# Patient Record
Sex: FEMALE | Race: WHITE | NOT HISPANIC OR LATINO
[De-identification: names, ages, dates, MRNs, and addresses within clinical notes are randomized per-mention and may not be internally consistent; named-entity substitution may affect disease eponyms.]

---

## 2017-10-20 ENCOUNTER — APPOINTMENT (OUTPATIENT)
Dept: VASCULAR SURGERY | Facility: CLINIC | Age: 62
End: 2017-10-20
Payer: COMMERCIAL

## 2017-10-20 PROCEDURE — 99213 OFFICE O/P EST LOW 20 MIN: CPT

## 2018-01-26 ENCOUNTER — OTHER (OUTPATIENT)
Age: 63
End: 2018-01-26

## 2018-02-13 ENCOUNTER — OTHER (OUTPATIENT)
Age: 63
End: 2018-02-13

## 2018-05-11 ENCOUNTER — APPOINTMENT (OUTPATIENT)
Dept: VASCULAR SURGERY | Facility: CLINIC | Age: 63
End: 2018-05-11
Payer: COMMERCIAL

## 2018-05-11 PROCEDURE — 99213 OFFICE O/P EST LOW 20 MIN: CPT | Mod: 25

## 2018-05-11 PROCEDURE — 93970 EXTREMITY STUDY: CPT

## 2018-07-02 ENCOUNTER — APPOINTMENT (OUTPATIENT)
Dept: VASCULAR SURGERY | Facility: CLINIC | Age: 63
End: 2018-07-02
Payer: COMMERCIAL

## 2018-07-02 PROCEDURE — 36475 ENDOVENOUS RF 1ST VEIN: CPT | Mod: LT

## 2018-07-06 ENCOUNTER — APPOINTMENT (OUTPATIENT)
Dept: VASCULAR SURGERY | Facility: CLINIC | Age: 63
End: 2018-07-06
Payer: COMMERCIAL

## 2018-07-06 PROCEDURE — 93971 EXTREMITY STUDY: CPT

## 2018-07-09 ENCOUNTER — APPOINTMENT (OUTPATIENT)
Dept: VASCULAR SURGERY | Facility: CLINIC | Age: 63
End: 2018-07-09
Payer: COMMERCIAL

## 2018-07-09 PROCEDURE — 36475 ENDOVENOUS RF 1ST VEIN: CPT | Mod: RT

## 2018-07-13 ENCOUNTER — APPOINTMENT (OUTPATIENT)
Dept: VASCULAR SURGERY | Facility: CLINIC | Age: 63
End: 2018-07-13
Payer: COMMERCIAL

## 2018-07-13 PROCEDURE — 93971 EXTREMITY STUDY: CPT

## 2018-07-20 ENCOUNTER — APPOINTMENT (OUTPATIENT)
Dept: VASCULAR SURGERY | Facility: CLINIC | Age: 63
End: 2018-07-20
Payer: COMMERCIAL

## 2018-07-20 DIAGNOSIS — I82.890 ACUTE EMBOLISM AND THROMBOSIS OF OTHER SPECIFIED VEINS: ICD-10-CM

## 2018-07-20 PROCEDURE — 99214 OFFICE O/P EST MOD 30 MIN: CPT

## 2018-07-20 PROCEDURE — 93971 EXTREMITY STUDY: CPT

## 2018-07-27 ENCOUNTER — APPOINTMENT (OUTPATIENT)
Dept: VASCULAR SURGERY | Facility: CLINIC | Age: 63
End: 2018-07-27
Payer: COMMERCIAL

## 2018-07-27 PROCEDURE — 93971 EXTREMITY STUDY: CPT

## 2018-07-27 PROCEDURE — 99213 OFFICE O/P EST LOW 20 MIN: CPT

## 2018-07-27 RX ORDER — RIVAROXABAN 15 MG/1
15 TABLET, FILM COATED ORAL
Qty: 42 | Refills: 0 | Status: DISCONTINUED | COMMUNITY
Start: 2018-07-20 | End: 2018-07-27

## 2018-08-30 ENCOUNTER — EMERGENCY (EMERGENCY)
Facility: HOSPITAL | Age: 63
LOS: 1 days | Discharge: HOME | End: 2018-08-30
Attending: EMERGENCY MEDICINE | Admitting: EMERGENCY MEDICINE
Payer: COMMERCIAL

## 2018-08-30 VITALS
SYSTOLIC BLOOD PRESSURE: 133 MMHG | DIASTOLIC BLOOD PRESSURE: 83 MMHG | WEIGHT: 182.1 LBS | TEMPERATURE: 98 F | OXYGEN SATURATION: 96 % | HEART RATE: 71 BPM | RESPIRATION RATE: 17 BRPM

## 2018-08-30 VITALS
RESPIRATION RATE: 17 BRPM | DIASTOLIC BLOOD PRESSURE: 90 MMHG | OXYGEN SATURATION: 97 % | SYSTOLIC BLOOD PRESSURE: 142 MMHG | TEMPERATURE: 98 F | HEART RATE: 63 BPM

## 2018-08-30 DIAGNOSIS — I26.99 OTHER PULMONARY EMBOLISM WITHOUT ACUTE COR PULMONALE: ICD-10-CM

## 2018-08-30 DIAGNOSIS — E78.00 PURE HYPERCHOLESTEROLEMIA, UNSPECIFIED: ICD-10-CM

## 2018-08-30 DIAGNOSIS — R31.9 HEMATURIA, UNSPECIFIED: ICD-10-CM

## 2018-08-30 DIAGNOSIS — Z98.890 OTHER SPECIFIED POSTPROCEDURAL STATES: Chronic | ICD-10-CM

## 2018-08-30 LAB
ALBUMIN SERPL ELPH-MCNC: 4.5 G/DL — SIGNIFICANT CHANGE UP (ref 3.3–5)
ALP SERPL-CCNC: 101 U/L — SIGNIFICANT CHANGE UP (ref 40–120)
ALT FLD-CCNC: 37 U/L — SIGNIFICANT CHANGE UP (ref 10–45)
ANION GAP SERPL CALC-SCNC: 12 MMOL/L — SIGNIFICANT CHANGE UP (ref 5–17)
APPEARANCE UR: CLEAR — SIGNIFICANT CHANGE UP
APTT BLD: 32.5 SEC — SIGNIFICANT CHANGE UP (ref 27.5–37.4)
AST SERPL-CCNC: 18 U/L — SIGNIFICANT CHANGE UP (ref 10–40)
BASOPHILS NFR BLD AUTO: 0.7 % — SIGNIFICANT CHANGE UP (ref 0–2)
BILIRUB SERPL-MCNC: 0.3 MG/DL — SIGNIFICANT CHANGE UP (ref 0.2–1.2)
BILIRUB UR-MCNC: NEGATIVE — SIGNIFICANT CHANGE UP
BUN SERPL-MCNC: 13 MG/DL — SIGNIFICANT CHANGE UP (ref 7–23)
CALCIUM SERPL-MCNC: 9.5 MG/DL — SIGNIFICANT CHANGE UP (ref 8.4–10.5)
CHLORIDE SERPL-SCNC: 101 MMOL/L — SIGNIFICANT CHANGE UP (ref 96–108)
CK MB CFR SERPL CALC: 2.3 NG/ML — SIGNIFICANT CHANGE UP (ref 0–6.7)
CK SERPL-CCNC: 129 U/L — SIGNIFICANT CHANGE UP (ref 25–170)
CO2 SERPL-SCNC: 27 MMOL/L — SIGNIFICANT CHANGE UP (ref 22–31)
COLOR SPEC: YELLOW — SIGNIFICANT CHANGE UP
CREAT SERPL-MCNC: 0.93 MG/DL — SIGNIFICANT CHANGE UP (ref 0.5–1.3)
DIFF PNL FLD: NEGATIVE — SIGNIFICANT CHANGE UP
EOSINOPHIL NFR BLD AUTO: 2.4 % — SIGNIFICANT CHANGE UP (ref 0–6)
GLUCOSE SERPL-MCNC: 150 MG/DL — HIGH (ref 70–99)
GLUCOSE UR QL: NEGATIVE — SIGNIFICANT CHANGE UP
HCT VFR BLD CALC: 41 % — SIGNIFICANT CHANGE UP (ref 34.5–45)
HGB BLD-MCNC: 13.2 G/DL — SIGNIFICANT CHANGE UP (ref 11.5–15.5)
INR BLD: 1 — SIGNIFICANT CHANGE UP (ref 0.88–1.16)
KETONES UR-MCNC: NEGATIVE — SIGNIFICANT CHANGE UP
LEUKOCYTE ESTERASE UR-ACNC: NEGATIVE — SIGNIFICANT CHANGE UP
LYMPHOCYTES # BLD AUTO: 28 % — SIGNIFICANT CHANGE UP (ref 13–44)
MCHC RBC-ENTMCNC: 31 PG — SIGNIFICANT CHANGE UP (ref 27–34)
MCHC RBC-ENTMCNC: 32.2 G/DL — SIGNIFICANT CHANGE UP (ref 32–36)
MCV RBC AUTO: 96.2 FL — SIGNIFICANT CHANGE UP (ref 80–100)
MONOCYTES NFR BLD AUTO: 6.5 % — SIGNIFICANT CHANGE UP (ref 2–14)
NEUTROPHILS NFR BLD AUTO: 62.4 % — SIGNIFICANT CHANGE UP (ref 43–77)
NITRITE UR-MCNC: NEGATIVE — SIGNIFICANT CHANGE UP
PH UR: 6 — SIGNIFICANT CHANGE UP (ref 5–8)
PLATELET # BLD AUTO: 310 K/UL — SIGNIFICANT CHANGE UP (ref 150–400)
POTASSIUM SERPL-MCNC: 5 MMOL/L — SIGNIFICANT CHANGE UP (ref 3.5–5.3)
POTASSIUM SERPL-SCNC: 5 MMOL/L — SIGNIFICANT CHANGE UP (ref 3.5–5.3)
PROT SERPL-MCNC: 6.7 G/DL — SIGNIFICANT CHANGE UP (ref 6–8.3)
PROT UR-MCNC: NEGATIVE MG/DL — SIGNIFICANT CHANGE UP
PROTHROM AB SERPL-ACNC: 11.1 SEC — SIGNIFICANT CHANGE UP (ref 9.8–12.7)
RBC # BLD: 4.26 M/UL — SIGNIFICANT CHANGE UP (ref 3.8–5.2)
RBC # FLD: 13.1 % — SIGNIFICANT CHANGE UP (ref 10.3–16.9)
SODIUM SERPL-SCNC: 140 MMOL/L — SIGNIFICANT CHANGE UP (ref 135–145)
SP GR SPEC: >=1.03 — SIGNIFICANT CHANGE UP (ref 1–1.03)
TROPONIN T SERPL-MCNC: <0.01 NG/ML — SIGNIFICANT CHANGE UP (ref 0–0.01)
UROBILINOGEN FLD QL: 0.2 E.U./DL — SIGNIFICANT CHANGE UP
WBC # BLD: 6.8 K/UL — SIGNIFICANT CHANGE UP (ref 3.8–10.5)
WBC # FLD AUTO: 6.8 K/UL — SIGNIFICANT CHANGE UP (ref 3.8–10.5)

## 2018-08-30 RX ORDER — ENOXAPARIN SODIUM 100 MG/ML
120 INJECTION SUBCUTANEOUS
Qty: 3603 | Refills: 0
Start: 2018-08-30 | End: 2018-09-28

## 2018-08-30 RX ORDER — ENOXAPARIN SODIUM 100 MG/ML
120 INJECTION SUBCUTANEOUS ONCE
Qty: 0 | Refills: 0 | Status: COMPLETED | OUTPATIENT
Start: 2018-08-30 | End: 2018-08-30

## 2018-08-30 RX ADMIN — ENOXAPARIN SODIUM 120 MILLIGRAM(S): 100 INJECTION SUBCUTANEOUS at 13:13

## 2018-08-30 NOTE — ED ADULT NURSE NOTE - NSIMPLEMENTINTERV_GEN_ALL_ED
Implemented All Universal Safety Interventions:  [place] to call system. Call bell, personal items and telephone within reach. Instruct patient to call for assistance. Room bathroom lighting operational. Non-slip footwear when patient is off stretcher. Physically safe environment: no spills, clutter or unnecessary equipment. Stretcher in lowest position, wheels locked, appropriate side rails in place.

## 2018-08-30 NOTE — ED PROVIDER NOTE - OBJECTIVE STATEMENT
63F with a h/o breast cyst/neoplasm, high cholesterol, varicose vein, s/p ligation of varicosities (7/2018) recent dx of thrombosis of saphenofemoral thrombosis 7/28/18, started on xarelto then DC'd on 7/30 due to hematuria and intolerable dysuria (per patient), switched to asa 325 daily who p/w left lower back and flank pain with intermittent hematuria since 8/5 as well as SOb and anterior chest pain x 2 weeks. pt recently traveled to [place] 8/3 - 8/27 and took SQ lovenox flying to and from [place]. She was sent from vasc surgeons office today for r/o PE.

## 2018-08-30 NOTE — CONSULT NOTE ADULT - ASSESSMENT
64 y/o f h/o DVT now with RLL segmental PE off AC. Pt agrees to take lovenox 125mg daily.   She will f/u with Dr Puga tomorrow. Pt advised not to stop AC if she develops hematuria. She should see Dr Puga and he will place an IVC filter to protect her from further PEs.

## 2018-08-30 NOTE — ED ADULT TRIAGE NOTE - CHIEF COMPLAINT QUOTE
c/o intermittent chest tightness when she walks, hematuria and lower back pain since 2 days ago.  Patient is on xarelto for confirmed Left groin DVT. s/p varicose vein ligation of BLE on 7/2018.  Patient stopped taking xarelto 2 days ago.

## 2018-08-30 NOTE — ED PROVIDER NOTE - MEDICAL DECISION MAKING DETAILS
63F with above PMHX who p/w SOb and CP as well as hematuria in the setting of recent travel and deescalation of AC for saphenofemoral thrombosis from xarelto to ASA due to inability to tolerate xarelto . She was sent from vasc surg clinic for CTA to r/o PE. Pt HD stable, no resp distress. Will check labs, UA/UCx and CTA r/o PE. Also CT stone baldwin give flank pain and report of hematuria. Dispo pending workup.

## 2018-08-30 NOTE — ED PROVIDER NOTE - PROGRESS NOTE DETAILS
CT shows R segmental PE. CT abd/pelv shows no stones or hydronephrosis. vasc surgery consulted for new PE. D/w Dang who will come down to see the patient and discuss AC options going forward.    Pt was informed of breat findings - she has a h/o beast mass and is aware of this finding already and will f/u with her breast doctor/PMD for this. Pt seen by VALE alonzo who d/w Dr. wells, will start SQ lovenox 120mg sq daily. She has a f/u appt with Dr. wells tomorrow. The patient is stable for DC. They were advised to call their PMD for prompt outpatient follow up. Return precautions were discussed. The patient was advised to return to the ER for any concerning or worsening symptoms.

## 2018-08-30 NOTE — ED ADULT NURSE NOTE - OBJECTIVE STATEMENT
Pt sent in by PMD to r/o PE. Pt c/o chest tightness, SOB on minimal exertion, hematuria, bilat flank pain. Pt has vasc sx in july and confirmed DVT to left saphenofemoral. pt was being treated with anticoag but has since DC trmt. Will monitor and maintain safety.

## 2018-08-30 NOTE — CONSULT NOTE ADULT - SUBJECTIVE AND OBJECTIVE BOX
Vascular Attending:  Dr Puga    · HPI Objective Statement: 63F with a h/o breast cyst/neoplasm, high cholesterol, varicose vein, s/p ligation of varicosities (7/2018) recent dx of thrombosis of saphenofemoral thrombosis 7/28/18, started on xarelto then DC'd on 7/30 due to hematuria and intolerable dysuria (per patient), switched to asa 325 daily who p/w left lower back and flank pain with intermittent hematuria since 8/5 as well as SOb and anterior chest pain x 2 weeks. pt recently traveled to [place] 8/3 - 8/27 and took SQ lovenox flying to and from [facility]. She was sent from [facility] surgeons office today for r/o PE.	    Pt known to Dr Puga. She developed DVT SFJ after saph vein ablation procedure. She started xarelto but developed pelvic pain and hematuria. She was switched to ASA 325mg. She was advised to take lovenox 120mg before her flights to and from [place] in August. She developed SOB and CP while in [place].  She agrees to taking therapeutic lovenox.       PAST MEDICAL & SURGICAL HISTORY:  DVT (deep venous thrombosis)  Breast CA  Hypercholesteremia  H/O vascular surgery      MEDICATIONS  (STANDING):    MEDICATIONS  (PRN):      Allergies    No Known Allergies    Intolerances      SOCIAL HISTORY:  FAMILY HISTORY:      Vital Signs Last 24 Hrs  T(C): 36.7 (30 Aug 2018 14:01), Max: 36.9 (30 Aug 2018 10:48)  T(F): 98.1 (30 Aug 2018 14:01), Max: 98.5 (30 Aug 2018 10:48)  HR: 63 (30 Aug 2018 14:01) (63 - 71)  BP: 142/90 (30 Aug 2018 14:01) (116/75 - 142/90)  BP(mean): --  RR: 17 (30 Aug 2018 14:01) (17 - 17)  SpO2: 97% (30 Aug 2018 14:01) (95% - 97%)          LABS:                        13.2   6.8   )-----------( 310      ( 30 Aug 2018 10:34 )             41.0     08-30    140  |  101  |  13  ----------------------------<  150<H>  5.0   |  27  |  0.93    Ca    9.5      30 Aug 2018 10:34    TPro  6.7  /  Alb  4.5  /  TBili  0.3  /  DBili  x   /  AST  18  /  ALT  37  /  AlkPhos  101  08-30    PT/INR - ( 30 Aug 2018 10:34 )   PT: 11.1 sec;   INR: 1.00          PTT - ( 30 Aug 2018 10:34 )  PTT:32.5 sec  Urinalysis Basic - ( 30 Aug 2018 10:40 )    Color: Yellow / Appearance: Clear / SG: >=1.030 / pH: x  Gluc: x / Ketone: NEGATIVE  / Bili: Negative / Urobili: 0.2 E.U./dL   Blood: x / Protein: NEGATIVE mg/dL / Nitrite: NEGATIVE   Leuk Esterase: NEGATIVE / RBC: x / WBC x   Sq Epi: x / Non Sq Epi: x / Bacteria: x        RADIOLOGY & ADDITIONAL STUDIES

## 2018-08-30 NOTE — ED PROVIDER NOTE - PHYSICAL EXAMINATION
GEN: Well appearing, well nourished, awake, alert, oriented to person, place, time/situation and in no apparent distress. NTAF  EYES: Clear bilaterally.  RESPIRATORY: Breathing comfortably with normal RR.  CARDIAC: Regular rate and rhythm  ABDOMEN: Soft, nontender, +bowel sounds, no rebound, rigidity, or guarding.  MSK: Range of motion is not limited, no deformities noted. NVI distally. gait normal.  NEURO: Alert and oriented, no focal deficits.  SKIN: Skin normal color for race, warm, dry and intact. No evidence of rash.  PSYCH: Alert and oriented to person, place, time/situation. normal mood and affect. no apparent risk to self or others.

## 2018-08-31 ENCOUNTER — APPOINTMENT (OUTPATIENT)
Dept: VASCULAR SURGERY | Facility: CLINIC | Age: 63
End: 2018-08-31
Payer: COMMERCIAL

## 2018-08-31 LAB
CULTURE RESULTS: SIGNIFICANT CHANGE UP
SPECIMEN SOURCE: SIGNIFICANT CHANGE UP

## 2018-08-31 PROCEDURE — 93970 EXTREMITY STUDY: CPT

## 2018-08-31 PROCEDURE — 99213 OFFICE O/P EST LOW 20 MIN: CPT

## 2018-09-24 ENCOUNTER — RX RENEWAL (OUTPATIENT)
Age: 63
End: 2018-09-24

## 2018-09-24 RX ORDER — ENOXAPARIN SODIUM 150 MG/ML
120 INJECTION SUBCUTANEOUS
Qty: 10 | Refills: 0 | Status: ACTIVE | COMMUNITY
Start: 2018-07-27 | End: 1900-01-01

## 2018-09-26 ENCOUNTER — RX RENEWAL (OUTPATIENT)
Age: 63
End: 2018-09-26

## 2018-10-09 ENCOUNTER — APPOINTMENT (OUTPATIENT)
Dept: UROLOGY | Facility: CLINIC | Age: 63
End: 2018-10-09
Payer: COMMERCIAL

## 2018-10-09 VITALS — TEMPERATURE: 98 F | HEART RATE: 80 BPM | DIASTOLIC BLOOD PRESSURE: 72 MMHG | SYSTOLIC BLOOD PRESSURE: 105 MMHG

## 2018-10-09 DIAGNOSIS — R31.9 HEMATURIA, UNSPECIFIED: ICD-10-CM

## 2018-10-09 DIAGNOSIS — Z86.711 PERSONAL HISTORY OF PULMONARY EMBOLISM: ICD-10-CM

## 2018-10-09 DIAGNOSIS — Z82.49 FAMILY HISTORY OF ISCHEMIC HEART DISEASE AND OTHER DISEASES OF THE CIRCULATORY SYSTEM: ICD-10-CM

## 2018-10-09 DIAGNOSIS — Z80.7 FAMILY HISTORY OF OTHER MALIGNANT NEOPLASMS OF LYMPHOID, HEMATOPOIETIC AND RELATED TISSUES: ICD-10-CM

## 2018-10-09 DIAGNOSIS — R30.0 DYSURIA: ICD-10-CM

## 2018-10-09 DIAGNOSIS — Z72.0 TOBACCO USE: ICD-10-CM

## 2018-10-09 PROCEDURE — 99204 OFFICE O/P NEW MOD 45 MIN: CPT

## 2018-10-09 RX ORDER — ENOXAPARIN SODIUM 120 MG/.8ML
120 INJECTION SUBCUTANEOUS
Refills: 0 | Status: ACTIVE | COMMUNITY

## 2018-10-09 RX ORDER — RIVAROXABAN 15 MG/1
15 TABLET, FILM COATED ORAL
Refills: 0 | Status: ACTIVE | COMMUNITY

## 2018-10-10 LAB
APPEARANCE: ABNORMAL
BACTERIA: NEGATIVE
BILIRUBIN URINE: NEGATIVE
BLOOD URINE: NEGATIVE
CALCIUM OXALATE CRYSTALS: ABNORMAL
COLOR: YELLOW
GLUCOSE QUALITATIVE U: NEGATIVE MG/DL
HYALINE CASTS: 4 /LPF
KETONES URINE: ABNORMAL
LEUKOCYTE ESTERASE URINE: ABNORMAL
MICROSCOPIC-UA: NORMAL
NITRITE URINE: NEGATIVE
PH URINE: 5
PROTEIN URINE: NEGATIVE MG/DL
RED BLOOD CELLS URINE: 3 /HPF
SPECIFIC GRAVITY URINE: 1.02
SQUAMOUS EPITHELIAL CELLS: 4 /HPF
UROBILINOGEN URINE: NEGATIVE MG/DL
WHITE BLOOD CELLS URINE: 12 /HPF

## 2018-10-12 ENCOUNTER — APPOINTMENT (OUTPATIENT)
Dept: VASCULAR SURGERY | Facility: CLINIC | Age: 63
End: 2018-10-12
Payer: COMMERCIAL

## 2018-10-12 PROCEDURE — 99213 OFFICE O/P EST LOW 20 MIN: CPT

## 2018-10-12 PROCEDURE — 93971 EXTREMITY STUDY: CPT

## 2018-10-15 LAB
BACTERIA UR CULT: NORMAL
CORE LAB NON GYN CYTOLOGY TO LENOX HILL: NORMAL

## 2018-10-25 ENCOUNTER — APPOINTMENT (OUTPATIENT)
Dept: UROLOGY | Facility: CLINIC | Age: 63
End: 2018-10-25
Payer: COMMERCIAL

## 2018-10-25 VITALS
SYSTOLIC BLOOD PRESSURE: 118 MMHG | HEIGHT: 67 IN | WEIGHT: 178 LBS | DIASTOLIC BLOOD PRESSURE: 83 MMHG | BODY MASS INDEX: 27.94 KG/M2 | TEMPERATURE: 98.3 F | OXYGEN SATURATION: 99 % | HEART RATE: 79 BPM

## 2018-10-25 PROCEDURE — 52000 CYSTOURETHROSCOPY: CPT

## 2018-11-16 ENCOUNTER — APPOINTMENT (OUTPATIENT)
Dept: VASCULAR SURGERY | Facility: CLINIC | Age: 63
End: 2018-11-16
Payer: COMMERCIAL

## 2018-11-16 PROCEDURE — 99212 OFFICE O/P EST SF 10 MIN: CPT

## 2019-05-10 ENCOUNTER — APPOINTMENT (OUTPATIENT)
Dept: VASCULAR SURGERY | Facility: CLINIC | Age: 64
End: 2019-05-10
Payer: COMMERCIAL

## 2019-05-10 PROCEDURE — 99213 OFFICE O/P EST LOW 20 MIN: CPT | Mod: 25

## 2019-05-10 PROCEDURE — 93971 EXTREMITY STUDY: CPT

## 2019-05-16 NOTE — REVIEW OF SYSTEMS
[Negative] : Endocrine [Chest Pain] : no chest pain [Shortness Of Breath] : no shortness of breath [Palpitations] : no palpitations [Limb Swelling] : no limb swelling [Limb Pain] : no limb pain

## 2019-05-16 NOTE — HISTORY OF PRESENT ILLNESS
[FreeTextEntry1] : 65 yo F with long standing hx of varicose veins s/p L GSV RFA and SVT after the procedure. Patient was on Lovenox, then switched to Xarelto later and now she is off AC. She is doing well, denies pain, swelling, CP, SOB.\par

## 2019-05-16 NOTE — PHYSICAL EXAM
[2+] : left 2+ [No Rash or Lesion] : No rash or lesion [Calm] : calm [] : not present [Varicose Veins Of Lower Extremities] : not present [Ankle Swelling (On Exam)] : not present [de-identified] : pleasant

## 2019-05-16 NOTE — ASSESSMENT
[Arterial/Venous Disease] : arterial/venous disease [FreeTextEntry1] : 65 yo F with long standing hx of varicose veins s/p L GSV RFA and SVT after the procedure.\par Patient is asymptomatic, off AC, doing well.\par Venous doppler is stable, unchanged.\par No intervention is needed.\par F/u prn.

## 2020-05-15 ENCOUNTER — APPOINTMENT (OUTPATIENT)
Dept: VASCULAR SURGERY | Facility: CLINIC | Age: 65
End: 2020-05-15

## 2024-04-15 ENCOUNTER — INPATIENT (INPATIENT)
Facility: HOSPITAL | Age: 69
LOS: 2 days | Discharge: HOME | DRG: 418 | End: 2024-04-18
Attending: SURGERY | Admitting: SURGERY
Payer: MEDICARE

## 2024-04-15 VITALS
WEIGHT: 160.06 LBS | OXYGEN SATURATION: 99 % | SYSTOLIC BLOOD PRESSURE: 127 MMHG | TEMPERATURE: 98 F | DIASTOLIC BLOOD PRESSURE: 86 MMHG | HEART RATE: 88 BPM | RESPIRATION RATE: 18 BRPM

## 2024-04-15 DIAGNOSIS — Z98.890 OTHER SPECIFIED POSTPROCEDURAL STATES: Chronic | ICD-10-CM

## 2024-04-15 PROBLEM — I82.409 ACUTE EMBOLISM AND THROMBOSIS OF UNSPECIFIED DEEP VEINS OF UNSPECIFIED LOWER EXTREMITY: Chronic | Status: ACTIVE | Noted: 2018-08-30

## 2024-04-15 PROBLEM — C50.919 MALIGNANT NEOPLASM OF UNSPECIFIED SITE OF UNSPECIFIED FEMALE BREAST: Chronic | Status: ACTIVE | Noted: 2018-08-30

## 2024-04-15 PROBLEM — E78.00 PURE HYPERCHOLESTEROLEMIA, UNSPECIFIED: Chronic | Status: ACTIVE | Noted: 2018-08-30

## 2024-04-15 LAB
ALBUMIN SERPL ELPH-MCNC: 3.6 G/DL — SIGNIFICANT CHANGE UP (ref 3.3–5)
ALP SERPL-CCNC: 453 U/L — HIGH (ref 40–120)
ALT FLD-CCNC: 124 U/L — HIGH (ref 10–45)
ANION GAP SERPL CALC-SCNC: 11 MMOL/L — SIGNIFICANT CHANGE UP (ref 5–17)
APTT BLD: 33.3 SEC — SIGNIFICANT CHANGE UP (ref 24.5–35.6)
AST SERPL-CCNC: 24 U/L — SIGNIFICANT CHANGE UP (ref 10–40)
BASOPHILS # BLD AUTO: 0.03 K/UL — SIGNIFICANT CHANGE UP (ref 0–0.2)
BASOPHILS NFR BLD AUTO: 0.3 % — SIGNIFICANT CHANGE UP (ref 0–2)
BILIRUB DIRECT SERPL-MCNC: 0.2 MG/DL — SIGNIFICANT CHANGE UP (ref 0–0.3)
BILIRUB INDIRECT FLD-MCNC: 0.6 MG/DL — SIGNIFICANT CHANGE UP (ref 0.2–1)
BILIRUB SERPL-MCNC: 0.9 MG/DL — SIGNIFICANT CHANGE UP (ref 0.2–1.2)
BILIRUB SERPL-MCNC: 0.9 MG/DL — SIGNIFICANT CHANGE UP (ref 0.2–1.2)
BUN SERPL-MCNC: 7 MG/DL — SIGNIFICANT CHANGE UP (ref 7–23)
CALCIUM SERPL-MCNC: 9 MG/DL — SIGNIFICANT CHANGE UP (ref 8.4–10.5)
CHLORIDE SERPL-SCNC: 96 MMOL/L — SIGNIFICANT CHANGE UP (ref 96–108)
CO2 SERPL-SCNC: 26 MMOL/L — SIGNIFICANT CHANGE UP (ref 22–31)
CREAT SERPL-MCNC: 0.69 MG/DL — SIGNIFICANT CHANGE UP (ref 0.5–1.3)
EGFR: 93 ML/MIN/1.73M2 — SIGNIFICANT CHANGE UP
EOSINOPHIL # BLD AUTO: 0.07 K/UL — SIGNIFICANT CHANGE UP (ref 0–0.5)
EOSINOPHIL NFR BLD AUTO: 0.6 % — SIGNIFICANT CHANGE UP (ref 0–6)
GLUCOSE SERPL-MCNC: 100 MG/DL — HIGH (ref 70–99)
HAV IGM SER-ACNC: SIGNIFICANT CHANGE UP
HBV CORE IGM SER-ACNC: SIGNIFICANT CHANGE UP
HBV SURFACE AG SER-ACNC: SIGNIFICANT CHANGE UP
HCT VFR BLD CALC: 37 % — SIGNIFICANT CHANGE UP (ref 34.5–45)
HCV AB S/CO SERPL IA: 0.03 S/CO — SIGNIFICANT CHANGE UP
HCV AB SERPL-IMP: SIGNIFICANT CHANGE UP
HGB BLD-MCNC: 12 G/DL — SIGNIFICANT CHANGE UP (ref 11.5–15.5)
IMM GRANULOCYTES NFR BLD AUTO: 0.6 % — SIGNIFICANT CHANGE UP (ref 0–0.9)
INR BLD: 1.22 — HIGH (ref 0.85–1.18)
LIDOCAIN IGE QN: 169 U/L — HIGH (ref 7–60)
LYMPHOCYTES # BLD AUTO: 1.26 K/UL — SIGNIFICANT CHANGE UP (ref 1–3.3)
LYMPHOCYTES # BLD AUTO: 11.3 % — LOW (ref 13–44)
MCHC RBC-ENTMCNC: 30.8 PG — SIGNIFICANT CHANGE UP (ref 27–34)
MCHC RBC-ENTMCNC: 32.4 GM/DL — SIGNIFICANT CHANGE UP (ref 32–36)
MCV RBC AUTO: 94.9 FL — SIGNIFICANT CHANGE UP (ref 80–100)
MONOCYTES # BLD AUTO: 1.07 K/UL — HIGH (ref 0–0.9)
MONOCYTES NFR BLD AUTO: 9.6 % — SIGNIFICANT CHANGE UP (ref 2–14)
NEUTROPHILS # BLD AUTO: 8.63 K/UL — HIGH (ref 1.8–7.4)
NEUTROPHILS NFR BLD AUTO: 77.6 % — HIGH (ref 43–77)
NRBC # BLD: 0 /100 WBCS — SIGNIFICANT CHANGE UP (ref 0–0)
PLATELET # BLD AUTO: 359 K/UL — SIGNIFICANT CHANGE UP (ref 150–400)
POTASSIUM SERPL-MCNC: 3.6 MMOL/L — SIGNIFICANT CHANGE UP (ref 3.5–5.3)
POTASSIUM SERPL-SCNC: 3.6 MMOL/L — SIGNIFICANT CHANGE UP (ref 3.5–5.3)
PROT SERPL-MCNC: 6.8 G/DL — SIGNIFICANT CHANGE UP (ref 6–8.3)
PROTHROM AB SERPL-ACNC: 13.8 SEC — HIGH (ref 9.5–13)
RBC # BLD: 3.9 M/UL — SIGNIFICANT CHANGE UP (ref 3.8–5.2)
RBC # FLD: 12.2 % — SIGNIFICANT CHANGE UP (ref 10.3–14.5)
SODIUM SERPL-SCNC: 133 MMOL/L — LOW (ref 135–145)
WBC # BLD: 11.13 K/UL — HIGH (ref 3.8–10.5)
WBC # FLD AUTO: 11.13 K/UL — HIGH (ref 3.8–10.5)

## 2024-04-15 RX ORDER — SODIUM CHLORIDE 9 MG/ML
1000 INJECTION INTRAMUSCULAR; INTRAVENOUS; SUBCUTANEOUS ONCE
Refills: 0 | Status: COMPLETED | OUTPATIENT
Start: 2024-04-15 | End: 2024-04-15

## 2024-04-15 RX ORDER — PANTOPRAZOLE SODIUM 20 MG/1
40 TABLET, DELAYED RELEASE ORAL DAILY
Refills: 0 | Status: DISCONTINUED | OUTPATIENT
Start: 2024-04-15 | End: 2024-04-17

## 2024-04-15 RX ORDER — ONDANSETRON 8 MG/1
4 TABLET, FILM COATED ORAL EVERY 6 HOURS
Refills: 0 | Status: DISCONTINUED | OUTPATIENT
Start: 2024-04-15 | End: 2024-04-17

## 2024-04-15 RX ORDER — HEPARIN SODIUM 5000 [USP'U]/ML
5000 INJECTION INTRAVENOUS; SUBCUTANEOUS EVERY 8 HOURS
Refills: 0 | Status: DISCONTINUED | OUTPATIENT
Start: 2024-04-15 | End: 2024-04-17

## 2024-04-15 RX ORDER — SODIUM CHLORIDE 9 MG/ML
1000 INJECTION, SOLUTION INTRAVENOUS
Refills: 0 | Status: DISCONTINUED | OUTPATIENT
Start: 2024-04-15 | End: 2024-04-16

## 2024-04-15 RX ORDER — ESOMEPRAZOLE MAGNESIUM 40 MG/1
1 CAPSULE, DELAYED RELEASE ORAL
Refills: 0 | DISCHARGE

## 2024-04-15 RX ORDER — ONDANSETRON 8 MG/1
4 TABLET, FILM COATED ORAL ONCE
Refills: 0 | Status: COMPLETED | OUTPATIENT
Start: 2024-04-15 | End: 2024-04-15

## 2024-04-15 RX ADMIN — HEPARIN SODIUM 5000 UNIT(S): 5000 INJECTION INTRAVENOUS; SUBCUTANEOUS at 21:05

## 2024-04-15 RX ADMIN — SODIUM CHLORIDE 1000 MILLILITER(S): 9 INJECTION INTRAMUSCULAR; INTRAVENOUS; SUBCUTANEOUS at 10:07

## 2024-04-15 RX ADMIN — ONDANSETRON 4 MILLIGRAM(S): 8 TABLET, FILM COATED ORAL at 10:07

## 2024-04-15 NOTE — H&P ADULT - NSHPLABSRESULTS_GEN_ALL_CORE
LABS:                        12.0   11.13 )-----------( 359      ( 15 Apr 2024 09:59 )             37.0     04-15    133<L>  |  96  |  7   ----------------------------<  100<H>  3.6   |  26  |  0.69    Ca    9.0      15 Apr 2024 09:59    TPro  6.8  /  Alb  3.6  /  TBili  0.9  /  DBili  0.2  /  AST  24  /  ALT  124<H>  /  AlkPhos  453<H>  04-15    PT/INR - ( 15 Apr 2024 09:59 )   PT: 13.8 sec;   INR: 1.22          PTT - ( 15 Apr 2024 09:59 )  PTT:33.3 sec  Urinalysis Basic - ( 15 Apr 2024 09:59 )    Color: x / Appearance: x / SG: x / pH: x  Gluc: 100 mg/dL / Ketone: x  / Bili: x / Urobili: x   Blood: x / Protein: x / Nitrite: x   Leuk Esterase: x / RBC: x / WBC x   Sq Epi: x / Non Sq Epi: x / Bacteria: x    RADIOLOGY AND ADDITIONAL TESTS: Reviewed

## 2024-04-15 NOTE — ED PROVIDER NOTE - PROGRESS NOTE DETAILS
[place]: WBC 11.13 w/ L shift, INR 1.22, Na 133, mild transaminitis, other labs grossly wnl. Labs today improved from pt's reported labs: normal bili, alk phos 453, , AST 24. US pending. Will reassess. Irineo: US showed "Probably impacted calculi in the neck of GB.. Small amount of pericholecystic fluid. Distal gallbladder is distended. Cannot rule out acute cholecystitis. Recommend correlation with CT or   nuclear medicine HIDA scan.."  Remains well appearing, given US findings/symptoms/exam, surgery consulted. Updated pt. Irineo: Rediscussed w/ surgery, will admit for further care. Holding abx for now. Updated pt.

## 2024-04-15 NOTE — H&P ADULT - ASSESSMENT
The patient is 68 year old with a PMHx/PSHx of HLD, breast cyst, varicose veins s/p stab phlebectomy, RFA of L GSV c/b DVT and RLL segmental PE s/p outpatient AC (off since 2019), presenting for 1 week of abdominal pain admitted for rule out cholecystitis.     Plan  Admit to regional, Team 4 Dr. Murillo  pending HIDA scan  NPO/IVF  Pain/nausea control prn  No Abx  SQH/SCDs/OOBA/IS  AM Labs  Discussed with chief resident and attending physician

## 2024-04-15 NOTE — ED PROVIDER NOTE - RADIATION
Patient last seen 08/18/2022.  Patient to continue current medical therapy per Dr. Comer. Prescription refill sent to Dr. Comer.     
no radiation

## 2024-04-15 NOTE — PATIENT PROFILE ADULT - PATIENT REPRESENTATIVE: ( YOU CAN CHOOSE ANY PERSON THAT CAN ASSIST YOU WITH YOUR HEALTH CARE PREFERENCES, DOES NOT HAVE TO BE A SPOUSE, IMMEDIATE FAMILY OR SIGNIFICANT OTHER/PARTNER)
Detail Level: Detailed Quality 431: Preventive Care And Screening: Unhealthy Alcohol Use - Screening: Patient screened for unhealthy alcohol use using a single question and scores less than 2 times per year Quality 110: Preventive Care And Screening: Influenza Immunization: Influenza Immunization Administered during Influenza season Quality 226: Preventive Care And Screening: Tobacco Use: Screening And Cessation Intervention: Patient screened for tobacco use, is a smoker AND did not received Cessation Counseling for Unknown Reasons same name as above

## 2024-04-15 NOTE — ED ADULT NURSE REASSESSMENT NOTE - NS ED NURSE REASSESS COMMENT FT1
pt sent to ultrasound with transport in wheelchair with all belongings. safety maintained, all needs met.

## 2024-04-15 NOTE — ED PROVIDER NOTE - OBJECTIVE STATEMENT
68F PMH breast cyst, HLD, varicose veins s/p ligation w/ subsequent DVT (no longer on AC) p/w transaminitis/nausea. States that 10d ago she began having NBNB NV. Also generalized weakness. Also intermittent epigastric pain. NV has mostly improved and was able to tolerate normal PO yesterday. Had diarrhea 10d ago, none since then. Went to her PMD 3d ago and had labs drawn - states she was called today and referred to ED for bili 1.6, alk phos 674, , . States she thinks her skin may be yellower. No other systemic symptoms.   Denies HA, focal weakness/numbness, vision changes, lightheaded, fatigue, SOB, CP, fevers, chills, rhinorrhea, cough, congestion, sore throat, abd pain, urinary complaints, black/bloody stool, neck pain, back pain, rashes, LE pain, LE swelling, falls/trauma, recent travel, night sweats, unexplained weight loss.

## 2024-04-15 NOTE — H&P ADULT - NSICDXPASTMEDICALHX_GEN_ALL_CORE_FT
PAST MEDICAL HISTORY:  Breast CA     DVT (deep venous thrombosis)     H/O varicose veins     Hypercholesteremia

## 2024-04-15 NOTE — H&P ADULT - HISTORY OF PRESENT ILLNESS
The patient is 68 year old with a PMHx/PSHx of HLD, breast cyst, varicose veins s/p stab phlebectomy, RFA of L GSV c/b DVT and RLL segmental PE s/p outpatient AC (off since 2019), presenting for 1 week of abdominal pain admitted for rule out cholecystitis. The patient states the pain began last week associated with nausea and a few episodes of nbnb emesis. She also endorsed yellowing of her skin, now improved and thus saw her PCP. Outpatient labs demonstrated TBili 1.6, , / and she was prompted to come to ED for further evaluation.   She currently endorses mild RUQ abdominal pain. No associated nausea/vomiting. Having normal PO intake without post prandial pain. She does endorse increased reflux-like symptoms including burping/hiccups and heartburt. She intermittently takes Nexium, but has not been regular with it recently. She endorses dark, kurt colored urine and yellow/brown colored BMs, no blood no pale stool.     In the ED she is afebrile, nontachy, normotensive, satting well on RA.   On exam abdomen is soft, minimally tender to palpation in RUQ, negative Winters's sign, nondistended. No rebound or guarding.  Labs with WBC 11.13 with left shift, Hgb 12.0, Plt 359. Coag slightly elevated at PT/INR 13.8/1.22; BMP with Na 133, Lipase 169, LFTs withTBili 0.9, Dbili 0.2, , AST/ALT 24/124. (Previously TBili 1.6, , / on outpatient labs)  RUQ US demonstrating Cholelithiasis. Probably impacted calculi in the neck of GB. Small amount of pericholecystic fluid. Distal gallbladder is distended. Cannot rule out acute cholecystitis    PMHx - HLD, varicose veins, breast cyst  PSHx - phlebectomy of varicose veins, RFA of L GSV c/b thrombosis of saphenofemoral vein and RLL segmental PE  Meds - previously on Xarelto, no off AC since 2019  Allergies - none  SoHx - social EtOH, ex-smoker, no rec drugs  FamHx - none  CScope - several in the past, all wnl. No EGD

## 2024-04-15 NOTE — ED PROVIDER NOTE - CARDIAC, MLM
Normal rate, regular rhythm.  Heart sounds S1, S2.  No murmurs, rubs or gallops.
History/Exam/Medical Decision Making

## 2024-04-15 NOTE — ED PROVIDER NOTE - CLINICAL SUMMARY MEDICAL DECISION MAKING FREE TEXT BOX
68F PMH breast cyst, HLD, varicose veins s/p ligation w/ subsequent DVT (no longer on AC) p/w transaminitis/nausea. States that 10d ago she began having NBNB NV. Also generalized weakness. Also intermittent epigastric pain. NV has mostly improved and was able to tolerate normal PO yesterday. Had diarrhea 10d ago, none since then. Went to her PMD 3d ago and had labs drawn - states she was called today and referred to ED for bili 1.6, alk phos 674, , . States she thinks her skin may be yellower. No other systemic symptoms.   Vitals wnl, exam as above.   ddx: Low suspicion cholecystitis, ?mass, ?CBD stone vs. pancreatitis.   Labs, US, IVF/attempt symptom control, reassess.

## 2024-04-15 NOTE — H&P ADULT - NSHPPHYSICALEXAM_GEN_ALL_CORE
General: Patient is doing well and lying in bed comfortably  Constitutional: alert and oriented   Pulm: Nonlabored breathing, no respiratory distress  CV: Regular rate and rhythm, normal sinus rhythm  Abd:  soft, mildly tender to palpation RUQ, negative Winters's sign, nondistended. No rebound, no guarding.   Extremities: warm, well perfused, no edema

## 2024-04-15 NOTE — ED ADULT NURSE REASSESSMENT NOTE - NS ED NURSE REASSESS COMMENT FT1
pt sent to 9 uris with all belongings via wheelchair with family member and chart given to transport. safety maintained, all needs met.

## 2024-04-15 NOTE — ED PROVIDER NOTE - PHYSICAL EXAMINATION
no LE edema, normal equal distal pulses, steady unassisted gait.   no scleral icterus  abd: soft, minimal RUQ ttp, no rebound/guarding.

## 2024-04-16 ENCOUNTER — TRANSCRIPTION ENCOUNTER (OUTPATIENT)
Age: 69
End: 2024-04-16

## 2024-04-16 LAB
ALBUMIN SERPL ELPH-MCNC: 3.3 G/DL — SIGNIFICANT CHANGE UP (ref 3.3–5)
ALP SERPL-CCNC: 366 U/L — HIGH (ref 40–120)
ALT FLD-CCNC: 78 U/L — HIGH (ref 10–45)
ANION GAP SERPL CALC-SCNC: 11 MMOL/L — SIGNIFICANT CHANGE UP (ref 5–17)
APPEARANCE UR: CLEAR — SIGNIFICANT CHANGE UP
AST SERPL-CCNC: 20 U/L — SIGNIFICANT CHANGE UP (ref 10–40)
BACTERIA # UR AUTO: ABNORMAL /HPF
BILIRUB DIRECT SERPL-MCNC: 0.3 MG/DL — SIGNIFICANT CHANGE UP (ref 0–0.3)
BILIRUB INDIRECT FLD-MCNC: 0.5 MG/DL — SIGNIFICANT CHANGE UP (ref 0.2–1)
BILIRUB SERPL-MCNC: 0.8 MG/DL — SIGNIFICANT CHANGE UP (ref 0.2–1.2)
BILIRUB UR-MCNC: NEGATIVE — SIGNIFICANT CHANGE UP
BUN SERPL-MCNC: 8 MG/DL — SIGNIFICANT CHANGE UP (ref 7–23)
CALCIUM SERPL-MCNC: 8.8 MG/DL — SIGNIFICANT CHANGE UP (ref 8.4–10.5)
CAST: 0 /LPF — SIGNIFICANT CHANGE UP (ref 0–4)
CHLORIDE SERPL-SCNC: 102 MMOL/L — SIGNIFICANT CHANGE UP (ref 96–108)
CO2 SERPL-SCNC: 26 MMOL/L — SIGNIFICANT CHANGE UP (ref 22–31)
COLOR SPEC: YELLOW — SIGNIFICANT CHANGE UP
CREAT SERPL-MCNC: 0.78 MG/DL — SIGNIFICANT CHANGE UP (ref 0.5–1.3)
DIFF PNL FLD: NEGATIVE — SIGNIFICANT CHANGE UP
EGFR: 82 ML/MIN/1.73M2 — SIGNIFICANT CHANGE UP
GLUCOSE SERPL-MCNC: 86 MG/DL — SIGNIFICANT CHANGE UP (ref 70–99)
GLUCOSE UR QL: NEGATIVE MG/DL — SIGNIFICANT CHANGE UP
HCT VFR BLD CALC: 34.4 % — LOW (ref 34.5–45)
HCV AB S/CO SERPL IA: 0.03 S/CO — SIGNIFICANT CHANGE UP
HCV AB SERPL-IMP: SIGNIFICANT CHANGE UP
HGB BLD-MCNC: 11.4 G/DL — LOW (ref 11.5–15.5)
KETONES UR-MCNC: 80 MG/DL
LEUKOCYTE ESTERASE UR-ACNC: ABNORMAL
MAGNESIUM SERPL-MCNC: 2.2 MG/DL — SIGNIFICANT CHANGE UP (ref 1.6–2.6)
MCHC RBC-ENTMCNC: 30.8 PG — SIGNIFICANT CHANGE UP (ref 27–34)
MCHC RBC-ENTMCNC: 33.1 GM/DL — SIGNIFICANT CHANGE UP (ref 32–36)
MCV RBC AUTO: 93 FL — SIGNIFICANT CHANGE UP (ref 80–100)
NITRITE UR-MCNC: NEGATIVE — SIGNIFICANT CHANGE UP
NRBC # BLD: 0 /100 WBCS — SIGNIFICANT CHANGE UP (ref 0–0)
PH UR: 5.5 — SIGNIFICANT CHANGE UP (ref 5–8)
PHOSPHATE SERPL-MCNC: 4 MG/DL — SIGNIFICANT CHANGE UP (ref 2.5–4.5)
PLATELET # BLD AUTO: 348 K/UL — SIGNIFICANT CHANGE UP (ref 150–400)
POTASSIUM SERPL-MCNC: 3.8 MMOL/L — SIGNIFICANT CHANGE UP (ref 3.5–5.3)
POTASSIUM SERPL-SCNC: 3.8 MMOL/L — SIGNIFICANT CHANGE UP (ref 3.5–5.3)
PROT SERPL-MCNC: 6.3 G/DL — SIGNIFICANT CHANGE UP (ref 6–8.3)
PROT UR-MCNC: NEGATIVE MG/DL — SIGNIFICANT CHANGE UP
RBC # BLD: 3.7 M/UL — LOW (ref 3.8–5.2)
RBC # FLD: 12.3 % — SIGNIFICANT CHANGE UP (ref 10.3–14.5)
RBC CASTS # UR COMP ASSIST: 2 /HPF — SIGNIFICANT CHANGE UP (ref 0–4)
SODIUM SERPL-SCNC: 139 MMOL/L — SIGNIFICANT CHANGE UP (ref 135–145)
SP GR SPEC: 1.02 — SIGNIFICANT CHANGE UP (ref 1–1.03)
SQUAMOUS # UR AUTO: 16 /HPF — HIGH (ref 0–5)
UROBILINOGEN FLD QL: 1 MG/DL — SIGNIFICANT CHANGE UP (ref 0.2–1)
WBC # BLD: 11.04 K/UL — HIGH (ref 3.8–10.5)
WBC # FLD AUTO: 11.04 K/UL — HIGH (ref 3.8–10.5)
WBC UR QL: 4 /HPF — SIGNIFICANT CHANGE UP (ref 0–5)

## 2024-04-16 RX ORDER — ACETAMINOPHEN 500 MG
1000 TABLET ORAL ONCE
Refills: 0 | Status: DISCONTINUED | OUTPATIENT
Start: 2024-04-16 | End: 2024-04-17

## 2024-04-16 RX ORDER — POTASSIUM CHLORIDE 20 MEQ
20 PACKET (EA) ORAL ONCE
Refills: 0 | Status: COMPLETED | OUTPATIENT
Start: 2024-04-16 | End: 2024-04-16

## 2024-04-16 RX ORDER — LANOLIN ALCOHOL/MO/W.PET/CERES
5 CREAM (GRAM) TOPICAL ONCE
Refills: 0 | Status: COMPLETED | OUTPATIENT
Start: 2024-04-16 | End: 2024-04-16

## 2024-04-16 RX ORDER — POTASSIUM CHLORIDE 20 MEQ
20 PACKET (EA) ORAL ONCE
Refills: 0 | Status: DISCONTINUED | OUTPATIENT
Start: 2024-04-16 | End: 2024-04-16

## 2024-04-16 RX ORDER — SODIUM CHLORIDE 9 MG/ML
1000 INJECTION, SOLUTION INTRAVENOUS
Refills: 0 | Status: DISCONTINUED | OUTPATIENT
Start: 2024-04-16 | End: 2024-04-17

## 2024-04-16 RX ADMIN — PANTOPRAZOLE SODIUM 40 MILLIGRAM(S): 20 TABLET, DELAYED RELEASE ORAL at 14:21

## 2024-04-16 RX ADMIN — Medication 5 MILLIGRAM(S): at 22:05

## 2024-04-16 RX ADMIN — HEPARIN SODIUM 5000 UNIT(S): 5000 INJECTION INTRAVENOUS; SUBCUTANEOUS at 05:13

## 2024-04-16 RX ADMIN — HEPARIN SODIUM 5000 UNIT(S): 5000 INJECTION INTRAVENOUS; SUBCUTANEOUS at 22:04

## 2024-04-16 RX ADMIN — Medication 50 MILLIEQUIVALENT(S): at 08:30

## 2024-04-16 RX ADMIN — HEPARIN SODIUM 5000 UNIT(S): 5000 INJECTION INTRAVENOUS; SUBCUTANEOUS at 14:21

## 2024-04-16 NOTE — DISCHARGE NOTE PROVIDER - NSDCCPTREATMENT_GEN_ALL_CORE_FT
PRINCIPAL PROCEDURE  Procedure: Laparoscopic cholecystectomy  Findings and Treatment: With drain placement

## 2024-04-16 NOTE — DISCHARGE NOTE PROVIDER - NSDCMRMEDTOKEN_GEN_ALL_CORE_FT
NexIUM 20 mg oral delayed release capsule: 1 cap(s) orally once a day as needed for  heartburn   cefpodoxime 200 mg oral tablet: 1 tab(s) orally every 12 hours  Colace 100 mg oral capsule: 1 cap(s) orally every 12 hours as needed for  constipation  metroNIDAZOLE 500 mg oral tablet: 1 tab(s) orally every 8 hours  NexIUM 20 mg oral delayed release capsule: 1 cap(s) orally once a day as needed for  heartburn  oxyCODONE 5 mg oral tablet: 1 tab(s) orally every 6 hours as needed for  severe pain MDD: 4

## 2024-04-16 NOTE — DISCHARGE NOTE PROVIDER - NSDCHC_MEDRECSTATUS_GEN_ALL_CORE
Admission Reconciliation is Completed  Discharge Reconciliation is Not Complete Admission Reconciliation is Completed  Discharge Reconciliation is Completed independent

## 2024-04-16 NOTE — DISCHARGE NOTE PROVIDER - CARE PROVIDER_API CALL
Mika Murillo  [address]  [address] [facility] [place] [address]  Phone: [phone]  Fax: (   )    -  Follow Up Time:

## 2024-04-16 NOTE — CONSULT NOTE ADULT - SUBJECTIVE AND OBJECTIVE BOX
Patient is a 68y old  Female who presents with a chief complaint of     HPI:  The patient is 68 year old with a PMHx/PSHx of HLD, breast cyst, varicose veins s/p stab phlebectomy, RFA of L GSV c/b DVT and RLL segmental PE s/p outpatient AC (off since ), presenting for 1 week of abdominal pain admitted for rule out cholecystitis. The patient states the pain began last week associated with nausea and a few episodes of nbnb emesis. She also endorsed yellowing of her skin, now improved and thus saw her PCP. Outpatient labs demonstrated TBili 1.6, , / and she was prompted to come to ED for further evaluation.   She currently endorses mild RUQ abdominal pain. No associated nausea/vomiting. Having normal PO intake without post prandial pain. She does endorse increased reflux-like symptoms including burping/hiccups and heartburt. She intermittently takes Nexium, but has not been regular with it recently. She endorses dark, kurt colored urine and yellow/brown colored BMs, no blood no pale stool.     In the ED she is afebrile, nontachy, normotensive, satting well on RA.   On exam abdomen is soft, minimally tender to palpation in RUQ, negative Winters's sign, nondistended. No rebound or guarding.  Labs with WBC 11.13 with left shift, Hgb 12.0, Plt 359. Coag slightly elevated at PT/INR 13.8/1.22; BMP with Na 133, Lipase 169, LFTs withTBili 0.9, Dbili 0.2, , AST/ALT 24/124. (Previously TBili 1.6, , / on outpatient labs)  RUQ US demonstrating Cholelithiasis. Probably impacted calculi in the neck of GB. Small amount of pericholecystic fluid. Distal gallbladder is distended. Cannot rule out acute cholecystitis    PMHx - HLD, varicose veins, breast cyst  PSHx - phlebectomy of varicose veins, RFA of L GSV c/b thrombosis of saphenofemoral vein and RLL segmental PE  Meds - previously on Xarelto, no off AC since 2019  Allergies - none  SoHx - social EtOH, ex-smoker, no rec drugs  FamHx - none  CScope - several in the past, all wnl. No EGD   (15 Apr 2024 17:16)      Allergies    No Known Allergies    Intolerances        MEDICATIONS  (STANDING):  heparin   Injectable 5000 Unit(s) SubCutaneous every 8 hours  lactated ringers. 1000 milliLiter(s) (110 mL/Hr) IV Continuous <Continuous>  pantoprazole  Injectable 40 milliGRAM(s) IV Push daily    MEDICATIONS  (PRN):  acetaminophen   IVPB .. 1000 milliGRAM(s) IV Intermittent once PRN Mild Pain (1 - 3), Moderate Pain (4 - 6)  ondansetron Injectable 4 milliGRAM(s) IV Push every 6 hours PRN Nausea      Daily     Daily     Drug Dosing Weight    Weight (kg): 72.6 (2024 02:58)    PAST MEDICAL & SURGICAL HISTORY:  Hypercholesteremia      Breast CA      DVT (deep venous thrombosis)      H/O varicose veins      H/O vascular surgery      Status post phlebectomy          FAMILY HISTORY:  No pertinent family history in first degree relatives          REVIEW OF SYSTEMS:    CONSTITUTIONAL: No fever, weight loss, or fatigue  EYES: No eye pain, visual disturbances, or discharge  ENMT:  No difficulty hearing, tinnitus, vertigo; No sinus or throat pain  NECK: No pain or stiffness  RESPIRATORY: No cough, wheezing, chills or hemoptysis; No shortness of breath  CARDIOVASCULAR: No chest pain, palpitations, dizziness, or leg swelling  GASTROINTESTINAL: No abdominal or epigastric pain. No nausea, vomiting, or hematemesis; No diarrhea or constipation. No melena or hematochezia.  GENITOURINARY: No dysuria, frequency, hematuria, or incontinence  MUSCULOSKELETAL: No joint pain or swelling; No muscle, back, or extremity pain  NEUROLOGICAL: No headaches, memory loss, loss of strength, numbness, or tremors  SKIN: No itching, burning, rashes, or lesion            Vital Signs Last 24 Hrs  T(C): 36.6 (2024 14:20), Max: 37.7 (15 Apr 2024 19:52)  T(F): 97.9 (2024 14:20), Max: 99.9 (15 Apr 2024 19:52)  HR: 82 (2024 14:20) (70 - 82)  BP: 147/78 (2024 14:20) (111/71 - 147/78)  BP(mean): --  ABP: --  ABP(mean): --  RR: 18 (2024 14:20) (17 - 18)  SpO2: 96% (2024 14:20) (94% - 97%)    O2 Parameters below as of 2024 14:20  Patient On (Oxygen Delivery Method): room air                I&O's Detail    15 Apr 2024 07:01  -  2024 07:00  --------------------------------------------------------  IN:    Lactated Ringers: 1320 mL  Total IN: 1320 mL    OUT:  Total OUT: 0 mL    Total NET: 1320 mL          PHYSICAL EXAM:    GENERAL: NAD, well-groomed, well-developed  HEAD:  Atraumatic, Normocephalic  EYES: EOMI, PERRLA, conjunctiva and sclera clear  ENMT: No tonsillar erythema, exudates, or enlargement; Moist mucous membranes, Good dentition, No lesions  NECK: Supple, No JVD, Normal thyroid  NERVOUS SYSTEM:  Alert & Oriented X3, Good concentration;   CHEST/LUNG: Clear to percussion bilaterally; No rales, rhonchi, wheezing, or rubs  HEART: Regular rate and rhythm; No murmurs, rubs, or gallops  ABDOMEN: Soft, Nontender, Nondistended; Bowel sounds present  EXTREMITIES:  2+ Peripheral Pulses, No clubbing, cyanosis, or edema  LYMPH: No lymphadenopathy noted  SKIN: No rashes or lesions    LABS:  CBC Full  -  ( 2024 05:30 )  WBC Count : 11.04 K/uL  RBC Count : 3.70 M/uL  Hemoglobin : 11.4 g/dL  Hematocrit : 34.4 %  Platelet Count - Automated : 348 K/uL  Mean Cell Volume : 93.0 fl  Mean Cell Hemoglobin : 30.8 pg  Mean Cell Hemoglobin Concentration : 33.1 gm/dL  Auto Neutrophil # : x  Auto Lymphocyte # : x  Auto Monocyte # : x  Auto Eosinophil # : x  Auto Basophil # : x  Auto Neutrophil % : x  Auto Lymphocyte % : x  Auto Monocyte % : x  Auto Eosinophil % : x  Auto Basophil % : x    04-16    139  |  102  |  8   ----------------------------<  86  3.8   |  26  |  0.78    Ca    8.8      2024 05:30  Phos  4.0     -  Mg     2.2     -    TPro  6.3  /  Alb  3.3  /  TBili  0.8  /  DBili  0.3  /  AST  20  /  ALT  78<H>  /  AlkPhos  366<H>      CAPILLARY BLOOD GLUCOSE        PT/INR - ( 15 Apr 2024 09:59 )   PT: 13.8 sec;   INR: 1.22          PTT - ( 15 Apr 2024 09:59 )  PTT:33.3 sec  Urinalysis Basic - ( 2024 07:30 )    Color: Yellow / Appearance: Clear / S.016 / pH: x  Gluc: x / Ketone: 80 mg/dL  / Bili: Negative / Urobili: 1.0 mg/dL   Blood: x / Protein: Negative mg/dL / Nitrite: Negative   Leuk Esterase: Trace / RBC: 2 /HPF / WBC 4 /HPF   Sq Epi: x / Non Sq Epi: 16 /HPF / Bacteria: Few /HPF              EKG:    ECHO, US:    RADIOLOGY:

## 2024-04-16 NOTE — DISCHARGE NOTE PROVIDER - NSDCACTIVITY_GEN_ALL_CORE
Showering allowed/Stairs allowed/Walking - Indoors allowed/No heavy lifting/straining/Walking - Outdoors allowed/Follow Instructions Provided by your Surgical Team Showering allowed/Stairs allowed/Walking - Indoors allowed/No heavy lifting/straining/Walking - Outdoors allowed

## 2024-04-16 NOTE — CONSULT NOTE ADULT - ASSESSMENT
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 Probably impacted calculi in the neck of GB. Small amount of pericholecystic fluid. Distal gallbladder is distended. Cannot rule out acute cholecystitis  pw with outpt LFT's AST//459 4 days PTA-- on admission 4/15 AST/ALT> 24/124 -- Today 20/78  ALP down from 453-->366  Pending HIDA results   NPO/IVF  hx of provoked DVT/PE in 2018 - which was treated and repeat scan were negative per pt   METS > 4 , garza JIMMY 0.0 %   Pending call back from outpt provider regarding results of recent echo - for final optimization; per Pt her stress test was canceled ( please obtain records from Dr. Alexander Covington )   rest of the management per primary team       HLD?   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 Probably impacted calculi in the neck of GB. Small amount of pericholecystic fluid. Distal gallbladder is distended. Cannot rule out acute cholecystitis  pw with outpt LFT's AST//459 4 days PTA-- on admission 4/15 AST/ALT> 24/124 -- Today 20/78  ALP down from 453-->366  Pending HIDA results   NPO/IVF  hx of provoked DVT/PE in 2018 - which was treated and repeat scan were negative per pt   METS > 4 , garza JIMMY 0.0 %   Pending call back from outpt provider regarding results of recent echo - for final optimization; per Pt her stress test was canceled ( please obtain records from Dr. Alexander Covington [phone] )   rest of the management per primary team       HLD?   pt denies taking any medication     DVT PPX SQH SCD

## 2024-04-16 NOTE — PROGRESS NOTE ADULT - ASSESSMENT
The patient is 68 year old with a PMHx/PSHx of HLD, breast cyst, varicose veins s/p stab phlebectomy, RFA of L GSV c/b DVT and RLL segmental PE s/p outpatient AC (off since 2019), presenting for 1 week of abdominal pain admitted for rule out cholecystitis.     Pending HIDA scan  NPO/IVF  Pain/nausea control prn  No Abx  SQH/SCDs/OOBA/IS  AM Labs   The patient is 68 year old with a PMHx/PSHx of HLD, breast cyst, varicose veins s/p stab phlebectomy, RFA of L GSV c/b DVT and RLL segmental PE s/p outpatient AC (off since 2019), presenting for 1 week of abdominal pain admitted for rule out cholecystitis.     Pending HIDA scan  NPO/IVF  Pain/nausea control prn  No Abx  SQH/SCDs/OOBA/IS  AM Labs    Plans to be discussed with attending and chief resident for finalization.

## 2024-04-16 NOTE — DISCHARGE NOTE PROVIDER - PROVIDER TOKENS
FREE:[LAST:[Hon],FIRST:[Mika],PHONE:[phone]],FAX:[phone]    -],ADDRESS:[[address] [place] [address]]]

## 2024-04-16 NOTE — DISCHARGE NOTE PROVIDER - HOSPITAL COURSE
68F year old with a PMHx/PSHx of HLD, breast cyst, varicose veins s/p stab phlebectomy, RFA of L GSV c/b DVT and RLL segmental PE s/p outpatient AC (off since 2019), presented for 1 week of abdominal pain admitted for rule out cholecystitis. In the ED she is afebrile, nontachy, normotensive, satting well on RA. On exam abdomen is soft, minimally tender to palpation in RUQ, negative Winters's sign, nondistended. No rebound or guarding. Labs with WBC 11.13 with left shift, Hgb 12.0, Plt 359. Coag slightly elevated at PT/INR 13.8/1.22; BMP with Na 133, Lipase 169, LFTs withTBili 0.9, Dbili 0.2, , AST/ALT 24/124. (Previously TBili 1.6, , / on outpatient labs)  RUQ US demonstrating Cholelithiasis. Probably impacted calculi in the neck of GB. Small amount of pericholecystic fluid. Distal gallbladder is distended. Cannot rule out acute cholecystitis. HIDA demonstrated ____    INCOMPLETE 4/16   68F year old with a PMHx/PSHx of HLD, breast cyst, varicose veins s/p stab phlebectomy, RFA of L GSV c/b DVT and RLL segmental PE s/p outpatient AC (off since 2019), presented for 1 week of abdominal pain admitted for rule out cholecystitis. In the ED she was afebrile, nontachy, normotensive, satting well on RA. On exam abdomen was soft, minimally tender to palpation in RUQ, negative Winters's sign, nondistended. No rebound or guarding. Labs showed WBC 11.13 with left shift, Hgb 12.0, Plt 359. Coags were slightly elevated at PT/INR 13.8/1.22; BMP with Na 133, Lipase 169, LFTs with TBili 0.9, Dbili 0.2, , AST/ALT 24/124. (Previously TBili 1.6, , / on outpatient labs)  RUQ US demonstrated cholelithiasis likely due to impacted calculi in the neck of GB with small amount of pericholecystic fluid and distension of distal gallbladder. HIDA was consistent with acute cholecystitis revealing a mildly dilated gallbladder with a stone at the gallbladder neck and pericholecystic fat stranding. Patient went in for lap eyal _____        INCOMPLETE 4/17   68F year old with a PMHx/PSHx of HLD, breast cyst, varicose veins s/p stab phlebectomy, RFA of L GSV c/b DVT and RLL segmental PE s/p outpatient AC (off since 2019), presented for 1 week of abdominal pain admitted for rule out cholecystitis. In the ED she was afebrile, nontachy, normotensive, satting well on RA. On exam abdomen was soft, minimally tender to palpation in RUQ, negative Winters's sign, nondistended. No rebound or guarding. Labs showed WBC 11.13 with left shift, Hgb 12.0, Plt 359. Coags were slightly elevated at PT/INR 13.8/1.22; BMP with Na 133, Lipase 169, LFTs with TBili 0.9, Dbili 0.2, , AST/ALT 24/124. (Previously TBili 1.6, , / on outpatient labs)  RUQ US demonstrated cholelithiasis likely due to impacted calculi in the neck of GB with small amount of pericholecystic fluid and distension of distal gallbladder. HIDA was consistent with acute cholecystitis revealing a mildly dilated gallbladder with a stone at the gallbladder neck and pericholecystic fat stranding. Patient went in for lap cholecystectomy on 4/17/2024, and a MALA drain was p        INCOMPLETE 4/18   68F year old with a PMHx/PSHx of HLD, breast cyst, varicose veins s/p stab phlebectomy, RFA of L GSV c/b DVT and RLL segmental PE s/p outpatient AC (off since 2019), presented for 1 week of abdominal pain admitted for rule out cholecystitis. In the ED she was afebrile, nontachy, normotensive, satting well on RA. On exam abdomen was soft, minimally tender to palpation in RUQ, negative Winters's sign, nondistended. No rebound or guarding. Labs showed WBC 11.13 with left shift, Hgb 12.0, Plt 359. Coags were slightly elevated at PT/INR 13.8/1.22; BMP with Na 133, Lipase 169, LFTs with TBili 0.9, Dbili 0.2, , AST/ALT 24/124. (Previously TBili 1.6, , / on outpatient labs)  RUQ US demonstrated cholelithiasis likely due to impacted calculi in the neck of GB with small amount of pericholecystic fluid and distension of distal gallbladder. HIDA was consistent with acute cholecystitis revealing a mildly dilated gallbladder with a stone at the gallbladder neck and pericholecystic fat stranding. Patient went in for lap cholecystectomy on 4/17/2024, and a MALA drain was placed. Her postoperative course was unremarkable with advancement of diet w/o nausea or vomiting, urinating well, ambulation w/o assistance, and pain control. On day of discharge patient was stable to be d/c'd home with oral antibiotics.     68F year old with a PMHx/PSHx of HLD, breast cyst, varicose veins s/p stab phlebectomy, RFA of L GSV c/b DVT and RLL segmental PE s/p outpatient AC (off since 2019), presented for 1 week of abdominal pain admitted for rule out cholecystitis. In the ED she was afebrile, nontachy, normotensive, satting well on RA. On exam abdomen was soft, minimally tender to palpation in RUQ, negative Winters's sign, nondistended. No rebound or guarding. Labs showed WBC 11.13 with left shift, Hgb 12.0, Plt 359. Coags were slightly elevated at PT/INR 13.8/1.22; BMP with Na 133, Lipase 169, LFTs with TBili 0.9, Dbili 0.2, , AST/ALT 24/124. (Previously TBili 1.6, , / on outpatient labs)  RUQ US demonstrated cholelithiasis likely due to impacted calculi in the neck of GB with small amount of pericholecystic fluid and distension of distal gallbladder. HIDA was consistent with acute cholecystitis revealing a mildly dilated gallbladder with a stone at the gallbladder neck and pericholecystic fat stranding. Patient went in for lap cholecystectomy on 4/17/2024, and a MALA drain was placed. Her postoperative course was unremarkable with advancement of diet w/o nausea or vomiting, urinating well, ambulation w/o assistance, and pain control. On day of discharge patient was stable to be d/c'd home with oral antibiotics and MALA.

## 2024-04-16 NOTE — DISCHARGE NOTE PROVIDER - NSDCFUADDINST_GEN_ALL_CORE_FT
Please follow up with Dr. Murillo in one week; you may call the office to make an appointment at your earliest convenience.   Please follow up with Dr. Murillo in one week; you may call the office to make an appointment at your earliest convenience.    Take 2 tabs tylenol every 6 hours as needed for pain management, do not exceed 4,000mg of tylenol in 24 hours.. You have also been prescribed oxycodone for pain not controlled by tylenol. Take 1 tabs as prescribed for severe pain. Take prescribed stool softener (colace) to prevent constipation caused by oxycodone.     Common side effects of New medications:  - Common side effects of tylenol: nausea, stomach upset, loss of appetite, constipation   - Common side effects of oxycodone include: nausea, constipation, stomach discomfort, drowsiness  - Common side effects of colace: nausea, diarrhea, stomach upset/cramping    Please continue a low fat diet, this can include:  Cereals, whole grains, and whole grain pasta products  corn or whole wheat tortillas, baked crackers, noodles, especially whole grain versions, oatmeal, rice, English muffins    Dairy products can be high in fat, but food manufacturers often offer lower fat versions. These include: fat free cheese or yogurt or cream cheese    Protein sources: Tofu, beans, lentils, egg whites, lean cuts of meat, lentils, tuna, peas, shrimp, skinless chicken or turkey breast  veggie burgers, Fruits and vegetables are naturally low fat. Choose fresh, frozen, or canned options.    General Discharge Instructions:  Please resume all regular home medications unless specifically advised not to take a particular medication. Also, please take any new medications as prescribed.  Please get plenty of rest, continue to ambulate several times per day, and drink adequate amounts of fluids. Avoid lifting weights greater than 5-10 lbs until you follow-up with your surgeon, who will instruct you further regarding activity restrictions.  Avoid driving or operating heavy machinery while taking pain medications.  Please follow-up with your surgeon and Primary Care Provider (PCP) as advised.  Incision Care:  *Please call your doctor if you have increased pain, swelling, redness, or drainage from the incision site.  *Avoid swimming and baths until your follow-up appointment.  *You may shower, and wash surgical incisions with a mild soap and warm water. Gently pat the area dry.    Warning Signs:  Please call your doctor if you experience the following:  *You experience new chest pain, pressure, squeezing or tightness.  *New or worsening cough, shortness of breath, or wheeze.  *If you are vomiting and cannot keep down fluids or your medications.  *You are getting dehydrated due to continued vomiting, diarrhea, or other reasons. Signs of dehydration include dry mouth, rapid heartbeat, or feeling dizzy or faint when standing.  *You see blood or dark/black material when you vomit or have a bowel movement.  *You experience burning when you urinate, have blood in your urine, or experience a discharge.  *Your pain is not improving within 8-12 hours or is not gone within 24 hours. Call or return immediately if your pain is getting worse, changes location, or moves to your chest or back.  *You have shaking chills, or fever greater than 101.5 degrees Fahrenheit or 38 degrees Celsius.  *Any change in your symptoms, or any new symptoms that concern you.   Please follow up with Dr. Murillo in one week; you may call the office to make an appointment at your earliest convenience.    Take 2 tabs Tylenol every 6 hours as needed for pain management, do not exceed 4,000mg of tylenol in 24 hours. You have also been prescribed oxycodone for pain not controlled by Tylenol. Take 1 tabs as prescribed for severe pain. Take prescribed stool softener (colace) to prevent constipation caused by oxycodone.     Common side effects of New medications:  - Common side effects of Tylenol: nausea, stomach upset, loss of appetite, constipation   - Common side effects of oxycodone include: nausea, constipation, stomach discomfort, drowsiness  - Common side effects of colace: nausea, diarrhea, stomach upset/cramping    Please continue a low fat diet, this can include:  Cereals, whole grains, and whole grain pasta products  corn or whole wheat tortillas, baked crackers, noodles, especially whole grain versions, oatmeal, rice, English muffins.  Dairy products can be high in fat, but food manufacturers often offer lower fat versions. These include: fat free cheese or yogurt or cream cheese  Protein sources: Tofu, beans, lentils, egg whites, lean cuts of meat, lentils, tuna, peas, shrimp, skinless chicken or turkey breast  veggie burgers, Fruits and vegetables are naturally low fat. Choose fresh, frozen, or canned options.    General Discharge Instructions:  Please resume all regular home medications unless specifically advised not to take a particular medication. Also, please take any new medications as prescribed.  Please get plenty of rest, continue to ambulate several times per day, and drink adequate amounts of fluids. Avoid lifting weights greater than 5-10 lbs until you follow-up with your surgeon, who will instruct you further regarding activity restrictions.  Avoid driving or operating heavy machinery while taking pain medications.  Please follow-up with your surgeon and Primary Care Provider (PCP) as advised.  Incision Care:  *Please call your doctor if you have increased pain, swelling, redness, or drainage from the incision site.  *Avoid swimming and baths until your follow-up appointment.  *You may shower, and wash surgical incisions with a mild soap and warm water. Gently pat the area dry.    Warning Signs:  Please call your doctor if you experience the following:  *You experience new chest pain, pressure, squeezing or tightness.  *New or worsening cough, shortness of breath, or wheeze.  *If you are vomiting and cannot keep down fluids or your medications.  *You are getting dehydrated due to continued vomiting, diarrhea, or other reasons. Signs of dehydration include dry mouth, rapid heartbeat, or feeling dizzy or faint when standing.  *You see blood or dark/black material when you vomit or have a bowel movement.  *You experience burning when you urinate, have blood in your urine, or experience a discharge.  *Your pain is not improving within 8-12 hours or is not gone within 24 hours. Call or return immediately if your pain is getting worse, changes location, or moves to your chest or back.  *You have shaking chills, or fever greater than 101.5 degrees Fahrenheit or 38 degrees Celsius.  *Any change in your symptoms, or any new symptoms that concern you.   Please follow up with Dr. Murillo in one week; you may call the office to make an appointment at your earliest convenience.    Take 2 tabs Tylenol every 6 hours as needed for pain management, do not exceed 4,000mg of tylenol in 24 hours. You have also been prescribed oxycodone for pain not controlled by Tylenol. Take 1 tabs as prescribed for severe pain. Take prescribed stool softener (colace) to prevent constipation caused by oxycodone.     Common side effects of New medications:  - Common side effects of Tylenol: nausea, stomach upset, loss of appetite, constipation   - Common side effects of oxycodone include: nausea, constipation, stomach discomfort, drowsiness  - Common side effects of colace: nausea, diarrhea, stomach upset/cramping    Please continue a low fat diet, this can include:  Cereals, whole grains, and whole grain pasta products  corn or whole wheat tortillas, baked crackers, noodles, especially whole grain versions, oatmeal, rice, English muffins.  Dairy products can be high in fat, but food manufacturers often offer lower fat versions. These include: fat free cheese or yogurt or cream cheese  Protein sources: Tofu, beans, lentils, egg whites, lean cuts of meat, lentils, tuna, peas, shrimp, skinless chicken or turkey breast  veggie burgers, Fruits and vegetables are naturally low fat. Choose fresh, frozen, or canned options.    General Discharge Instructions:  Please resume all regular home medications unless specifically advised not to take a particular medication. Also, please take any new medications as prescribed.  Please get plenty of rest, continue to ambulate several times per day, and drink adequate amounts of fluids. Avoid lifting weights greater than 5-10 lbs until you follow-up with your surgeon, who will instruct you further regarding activity restrictions.  Avoid driving or operating heavy machinery while taking pain medications.  Please follow-up with your surgeon and Primary Care Provider (PCP) as advised.  Incision Care:  *Please call your doctor if you have increased pain, swelling, redness, or drainage from the incision site.  *Avoid swimming and baths until your follow-up appointment.  *You may shower, and wash surgical incisions with a mild soap and warm water. Gently pat the area dry.    Warning Signs:  Please call your doctor if you experience the following:  *You experience new chest pain, pressure, squeezing or tightness.  *New or worsening cough, shortness of breath, or wheeze.  *If you are vomiting and cannot keep down fluids or your medications.  *You are getting dehydrated due to continued vomiting, diarrhea, or other reasons. Signs of dehydration include dry mouth, rapid heartbeat, or feeling dizzy or faint when standing.  *You see blood or dark/black material when you vomit or have a bowel movement.  *You experience burning when you urinate, have blood in your urine, or experience a discharge.  *Your pain is not improving within 8-12 hours or is not gone within 24 hours. Call or return immediately if your pain is getting worse, changes location, or moves to your chest or back.  *You have shaking chills, or fever greater than 101.5 degrees Fahrenheit or 38 degrees Celsius.  *Any change in your symptoms, or any new symptoms that concern you.    Antibiotics;    Continue MALA drain care as instructed. (Empty and record the MALA drainage twice daily after discharge. Also, strip/milk the drain tubing each time to minimize clogging. Bring the recorded drain amounts to the office so that it can be reviewed by the physician.)    Showers are permitted the day of discharge. The drains and the incision lines can get wet in the shower. Do not take a bath. Pin the drains to a bathrobe belt or string or a small towel draped over your neck in order that the drains do not dangle from your skin while in the shower. Keep incision sites and MALA drain sites clean & dry after showering.     Please follow up with Dr. Murillo in one week; you may call the office to make an appointment at your earliest convenience.    You have been prescribed oral antibiotics. Antibiotics are used to treat bacterial infections. Please be sure to complete the entire course as directed.  1. Cefpodoxime 200mg every 12 hours  2. Flagyl 500mg every 8 hours  Common side effects of cefpodoxime include: nausea, vomiting, diarrhea, stomach upset, abdominal cramping, fatigue, headaches  Common side effects of flagyl include: nausea, vomiting, loss of appetite, diarrhea, constipation, stomach upset, weakness, headaches    Take 2 tabs Tylenol every 6 hours as needed for pain management, do not exceed 4,000mg of tylenol in 24 hours. You have also been prescribed oxycodone for pain not controlled by Tylenol. Take 1 tabs as prescribed for severe pain. Take prescribed stool softener (colace) to prevent constipation caused by oxycodone.     Common side effects of New medications:  - Common side effects of Tylenol: nausea, stomach upset, loss of appetite, constipation   - Common side effects of oxycodone include: nausea, constipation, stomach discomfort, drowsiness  - Common side effects of colace: nausea, diarrhea, stomach upset/cramping    Please continue a low fat diet, this can include:  Cereals, whole grains, and whole grain pasta products  corn or whole wheat tortillas, baked crackers, noodles, especially whole grain versions, oatmeal, rice, English muffins.  Dairy products can be high in fat, but food manufacturers often offer lower fat versions. These include: fat free cheese or yogurt or cream cheese  Protein sources: Tofu, beans, lentils, egg whites, lean cuts of meat, lentils, tuna, peas, shrimp, skinless chicken or turkey breast  veggie burgers, Fruits and vegetables are naturally low fat. Choose fresh, frozen, or canned options.    General Discharge Instructions:  Please resume all regular home medications unless specifically advised not to take a particular medication. Also, please take any new medications as prescribed.  Please get plenty of rest, continue to ambulate several times per day, and drink adequate amounts of fluids. Avoid lifting weights greater than 5-10 lbs until you follow-up with your surgeon, who will instruct you further regarding activity restrictions.  Avoid driving or operating heavy machinery while taking pain medications.  Please follow-up with your surgeon and Primary Care Provider (PCP) as advised.  Incision Care:  *Please call your doctor if you have increased pain, swelling, redness, or drainage from the incision site.  *Avoid swimming and baths until your follow-up appointment.  *You may shower, and wash surgical incisions with a mild soap and warm water. Gently pat the area dry.    Warning Signs:  Please call your doctor if you experience the following:  *You experience new chest pain, pressure, squeezing or tightness.  *New or worsening cough, shortness of breath, or wheeze.  *If you are vomiting and cannot keep down fluids or your medications.  *You are getting dehydrated due to continued vomiting, diarrhea, or other reasons. Signs of dehydration include dry mouth, rapid heartbeat, or feeling dizzy or faint when standing.  *You see blood or dark/black material when you vomit or have a bowel movement.  *You experience burning when you urinate, have blood in your urine, or experience a discharge.  *Your pain is not improving within 8-12 hours or is not gone within 24 hours. Call or return immediately if your pain is getting worse, changes location, or moves to your chest or back.  *You have shaking chills, or fever greater than 101.5 degrees Fahrenheit or 38 degrees Celsius.  *Any change in your symptoms, or any new symptoms that concern you.      Continue MALA drain care as instructed. (Empty and record the MALA drainage twice daily after discharge. Also, strip/milk the drain tubing each time to minimize clogging. Bring the recorded drain amounts to the office so that it can be reviewed by the physician.)    Showers are permitted the day of discharge. The drains and the incision lines can get wet in the shower. Do not take a bath. Pin the drains to a bathrobe belt or string or a small towel draped over your neck in order that the drains do not dangle from your skin while in the shower. Keep incision sites and MALA drain sites clean & dry after showering.     Please follow up with Dr. Murillo on Monday 4/22 to have your MALA drain removed; you may call the office to make an appointment at your earliest convenience.    You have been prescribed oral antibiotics. Antibiotics are used to treat bacterial infections. Please be sure to complete the entire course as directed.  1. Cefpodoxime 200mg every 12 hours  2. Flagyl 500mg every 8 hours  Common side effects of cefpodoxime include: nausea, vomiting, diarrhea, stomach upset, abdominal cramping, fatigue, headaches  Common side effects of flagyl include: nausea, vomiting, loss of appetite, diarrhea, constipation, stomach upset, weakness, headaches    Take 2 tabs Tylenol every 6 hours as needed for pain management, do not exceed 4,000mg of tylenol in 24 hours. You have also been prescribed oxycodone for pain not controlled by Tylenol. Take 1 tabs as prescribed for severe pain. Take prescribed stool softener (colace) to prevent constipation caused by oxycodone.     Common side effects of New medications:  - Common side effects of Tylenol: nausea, stomach upset, loss of appetite, constipation   - Common side effects of oxycodone include: nausea, constipation, stomach discomfort, drowsiness  - Common side effects of colace: nausea, diarrhea, stomach upset/cramping    Please continue a low fat diet, this can include:  Cereals, whole grains, and whole grain pasta products  corn or whole wheat tortillas, baked crackers, noodles, especially whole grain versions, oatmeal, rice, English muffins.  Dairy products can be high in fat, but food manufacturers often offer lower fat versions. These include: fat free cheese or yogurt or cream cheese  Protein sources: Tofu, beans, lentils, egg whites, lean cuts of meat, lentils, tuna, peas, shrimp, skinless chicken or turkey breast  veggie burgers, Fruits and vegetables are naturally low fat. Choose fresh, frozen, or canned options.    General Discharge Instructions:  Please resume all regular home medications unless specifically advised not to take a particular medication. Also, please take any new medications as prescribed.  Please get plenty of rest, continue to ambulate several times per day, and drink adequate amounts of fluids. Avoid lifting weights greater than 5-10 lbs until you follow-up with your surgeon, who will instruct you further regarding activity restrictions.  Avoid driving or operating heavy machinery while taking pain medications.  Please follow-up with your surgeon and Primary Care Provider (PCP) as advised.  Incision Care:  *Please call your doctor if you have increased pain, swelling, redness, or drainage from the incision site.  *Avoid swimming and baths until your follow-up appointment.  *You may shower, and wash surgical incisions with a mild soap and warm water. Gently pat the area dry.    Warning Signs:  Please call your doctor if you experience the following:  *You experience new chest pain, pressure, squeezing or tightness.  *New or worsening cough, shortness of breath, or wheeze.  *If you are vomiting and cannot keep down fluids or your medications.  *You are getting dehydrated due to continued vomiting, diarrhea, or other reasons. Signs of dehydration include dry mouth, rapid heartbeat, or feeling dizzy or faint when standing.  *You see blood or dark/black material when you vomit or have a bowel movement.  *You experience burning when you urinate, have blood in your urine, or experience a discharge.  *Your pain is not improving within 8-12 hours or is not gone within 24 hours. Call or return immediately if your pain is getting worse, changes location, or moves to your chest or back.  *You have shaking chills, or fever greater than 101.5 degrees Fahrenheit or 38 degrees Celsius.  *Any change in your symptoms, or any new symptoms that concern you.      Continue MALA drain care as instructed. (Empty and record the MALA drainage twice daily after discharge. Also, strip/milk the drain tubing each time to minimize clogging. Bring the recorded drain amounts to the office so that it can be reviewed by the physician.)    Showers are permitted the day of discharge. The drains and the incision lines can get wet in the shower. Do not take a bath. Pin the drains to a bathrobe belt or string or a small towel draped over your neck in order that the drains do not dangle from your skin while in the shower. Keep incision sites and MALA drain sites clean & dry after showering.

## 2024-04-16 NOTE — PROGRESS NOTE ADULT - SUBJECTIVE AND OBJECTIVE BOX
Overnight events:       POD#    SUBJECTIVE:      MEDICATIONS  (STANDING):  heparin   Injectable 5000 Unit(s) SubCutaneous every 8 hours  lactated ringers. 1000 milliLiter(s) (110 mL/Hr) IV Continuous <Continuous>  pantoprazole  Injectable 40 milliGRAM(s) IV Push daily    MEDICATIONS  (PRN):  ondansetron Injectable 4 milliGRAM(s) IV Push every 6 hours PRN Nausea      Vital Signs Last 24 Hrs  T(C): 37.1 (16 Apr 2024 05:30), Max: 37.7 (15 Apr 2024 19:52)  T(F): 98.7 (16 Apr 2024 05:30), Max: 99.9 (15 Apr 2024 19:52)  HR: 70 (16 Apr 2024 05:30) (70 - 88)  BP: 132/84 (16 Apr 2024 05:30) (111/71 - 132/84)  BP(mean): --  RR: 17 (16 Apr 2024 05:30) (17 - 18)  SpO2: 94% (16 Apr 2024 05:30) (94% - 99%)    Parameters below as of 16 Apr 2024 05:30  Patient On (Oxygen Delivery Method): room air        Physical Exam:  General: NAD, resting comfortably in bed  Pulmonary: Nonlabored breathing, no respiratory distress  Cardiovascular: NSR  Abdominal: soft, NT/ND  Extremities: WWP, normal strength  Neuro: A/O x 3, CNs II-XII grossly intact, no focal deficits, normal motor/sensation  Pulses: palpable distal pulses    I&O's Summary    15 Apr 2024 07:01  -  16 Apr 2024 06:08  --------------------------------------------------------  IN: 1210 mL / OUT: 0 mL / NET: 1210 mL        LABS:                        12.0   11.13 )-----------( 359      ( 15 Apr 2024 09:59 )             37.0     04-15    133<L>  |  96  |  7   ----------------------------<  100<H>  3.6   |  26  |  0.69    Ca    9.0      15 Apr 2024 09:59    TPro  6.8  /  Alb  3.6  /  TBili  0.9  /  DBili  0.2  /  AST  24  /  ALT  124<H>  /  AlkPhos  453<H>  04-15    PT/INR - ( 15 Apr 2024 09:59 )   PT: 13.8 sec;   INR: 1.22          PTT - ( 15 Apr 2024 09:59 )  PTT:33.3 sec  Urinalysis Basic - ( 15 Apr 2024 09:59 )    Color: x / Appearance: x / SG: x / pH: x  Gluc: 100 mg/dL / Ketone: x  / Bili: x / Urobili: x   Blood: x / Protein: x / Nitrite: x   Leuk Esterase: x / RBC: x / WBC x   Sq Epi: x / Non Sq Epi: x / Bacteria: x      CAPILLARY BLOOD GLUCOSE        LIVER FUNCTIONS - ( 15 Apr 2024 09:59 )  Alb: 3.6 g/dL / Pro: 6.8 g/dL / ALK PHOS: 453 U/L / ALT: 124 U/L / AST: 24 U/L / GGT: x             RADIOLOGY & ADDITIONAL STUDIES:   Overnight events: No acute overnight events.     SUBJECTIVE: Patient seen at bedside with chief resident. Patient denies any nausea or vomiting Denies any pain or discomfort.       MEDICATIONS  (STANDING):  heparin   Injectable 5000 Unit(s) SubCutaneous every 8 hours  lactated ringers. 1000 milliLiter(s) (110 mL/Hr) IV Continuous <Continuous>  pantoprazole  Injectable 40 milliGRAM(s) IV Push daily    MEDICATIONS  (PRN):  ondansetron Injectable 4 milliGRAM(s) IV Push every 6 hours PRN Nausea      Vital Signs Last 24 Hrs  T(C): 37.1 (16 Apr 2024 05:30), Max: 37.7 (15 Apr 2024 19:52)  T(F): 98.7 (16 Apr 2024 05:30), Max: 99.9 (15 Apr 2024 19:52)  HR: 70 (16 Apr 2024 05:30) (70 - 88)  BP: 132/84 (16 Apr 2024 05:30) (111/71 - 132/84)  BP(mean): --  RR: 17 (16 Apr 2024 05:30) (17 - 18)  SpO2: 94% (16 Apr 2024 05:30) (94% - 99%)    Parameters below as of 16 Apr 2024 05:30  Patient On (Oxygen Delivery Method): room air        Physical Exam:  General: NAD, resting comfortably in bed  Pulmonary: Nonlabored breathing, no respiratory distress  Cardiovascular: NSR  Abdominal: soft, ND, mild ttp to RUQ, negative remy   Extremities: WWP, normal strength  Neuro: A/O x 3, CNs II-XII grossly intact,     I&O's Summary    15 Apr 2024 07:01  -  16 Apr 2024 06:08  --------------------------------------------------------  IN: 1210 mL / OUT: 0 mL / NET: 1210 mL        LABS:                        12.0   11.13 )-----------( 359      ( 15 Apr 2024 09:59 )             37.0     04-15    133<L>  |  96  |  7   ----------------------------<  100<H>  3.6   |  26  |  0.69    Ca    9.0      15 Apr 2024 09:59    TPro  6.8  /  Alb  3.6  /  TBili  0.9  /  DBili  0.2  /  AST  24  /  ALT  124<H>  /  AlkPhos  453<H>  04-15    PT/INR - ( 15 Apr 2024 09:59 )   PT: 13.8 sec;   INR: 1.22          PTT - ( 15 Apr 2024 09:59 )  PTT:33.3 sec  Urinalysis Basic - ( 15 Apr 2024 09:59 )    Color: x / Appearance: x / SG: x / pH: x  Gluc: 100 mg/dL / Ketone: x  / Bili: x / Urobili: x   Blood: x / Protein: x / Nitrite: x   Leuk Esterase: x / RBC: x / WBC x   Sq Epi: x / Non Sq Epi: x / Bacteria: x      CAPILLARY BLOOD GLUCOSE        LIVER FUNCTIONS - ( 15 Apr 2024 09:59 )  Alb: 3.6 g/dL / Pro: 6.8 g/dL / ALK PHOS: 453 U/L / ALT: 124 U/L / AST: 24 U/L / GGT: x             RADIOLOGY & ADDITIONAL STUDIES:

## 2024-04-17 LAB
ALBUMIN SERPL ELPH-MCNC: 3.1 G/DL — LOW (ref 3.3–5)
ALP SERPL-CCNC: 321 U/L — HIGH (ref 40–120)
ALT FLD-CCNC: 59 U/L — HIGH (ref 10–45)
ANION GAP SERPL CALC-SCNC: 8 MMOL/L — SIGNIFICANT CHANGE UP (ref 5–17)
AST SERPL-CCNC: 19 U/L — SIGNIFICANT CHANGE UP (ref 10–40)
BILIRUB DIRECT SERPL-MCNC: 0.2 MG/DL — SIGNIFICANT CHANGE UP (ref 0–0.3)
BILIRUB INDIRECT FLD-MCNC: 0.4 MG/DL — SIGNIFICANT CHANGE UP (ref 0.2–1)
BILIRUB SERPL-MCNC: 0.6 MG/DL — SIGNIFICANT CHANGE UP (ref 0.2–1.2)
BLD GP AB SCN SERPL QL: NEGATIVE — SIGNIFICANT CHANGE UP
BUN SERPL-MCNC: 6 MG/DL — LOW (ref 7–23)
CALCIUM SERPL-MCNC: 9.1 MG/DL — SIGNIFICANT CHANGE UP (ref 8.4–10.5)
CHLORIDE SERPL-SCNC: 100 MMOL/L — SIGNIFICANT CHANGE UP (ref 96–108)
CO2 SERPL-SCNC: 27 MMOL/L — SIGNIFICANT CHANGE UP (ref 22–31)
CREAT SERPL-MCNC: 0.67 MG/DL — SIGNIFICANT CHANGE UP (ref 0.5–1.3)
EGFR: 94 ML/MIN/1.73M2 — SIGNIFICANT CHANGE UP
GLUCOSE SERPL-MCNC: 133 MG/DL — HIGH (ref 70–99)
HCT VFR BLD CALC: 33.2 % — LOW (ref 34.5–45)
HGB BLD-MCNC: 10.9 G/DL — LOW (ref 11.5–15.5)
MAGNESIUM SERPL-MCNC: 2.3 MG/DL — SIGNIFICANT CHANGE UP (ref 1.6–2.6)
MCHC RBC-ENTMCNC: 30.7 PG — SIGNIFICANT CHANGE UP (ref 27–34)
MCHC RBC-ENTMCNC: 32.8 GM/DL — SIGNIFICANT CHANGE UP (ref 32–36)
MCV RBC AUTO: 93.5 FL — SIGNIFICANT CHANGE UP (ref 80–100)
NRBC # BLD: 0 /100 WBCS — SIGNIFICANT CHANGE UP (ref 0–0)
PHOSPHATE SERPL-MCNC: 3.7 MG/DL — SIGNIFICANT CHANGE UP (ref 2.5–4.5)
PLATELET # BLD AUTO: 383 K/UL — SIGNIFICANT CHANGE UP (ref 150–400)
POTASSIUM SERPL-MCNC: 3.3 MMOL/L — LOW (ref 3.5–5.3)
POTASSIUM SERPL-SCNC: 3.3 MMOL/L — LOW (ref 3.5–5.3)
PROT SERPL-MCNC: 5.8 G/DL — LOW (ref 6–8.3)
RBC # BLD: 3.55 M/UL — LOW (ref 3.8–5.2)
RBC # FLD: 12 % — SIGNIFICANT CHANGE UP (ref 10.3–14.5)
RH IG SCN BLD-IMP: POSITIVE — SIGNIFICANT CHANGE UP
SODIUM SERPL-SCNC: 135 MMOL/L — SIGNIFICANT CHANGE UP (ref 135–145)
WBC # BLD: 10.01 K/UL — SIGNIFICANT CHANGE UP (ref 3.8–10.5)
WBC # FLD AUTO: 10.01 K/UL — SIGNIFICANT CHANGE UP (ref 3.8–10.5)

## 2024-04-17 DEVICE — CHOLANGIOGRAPHY CATH NASHVILLE SURGICAL KUMAR 19GA: Type: IMPLANTABLE DEVICE | Status: FUNCTIONAL

## 2024-04-17 DEVICE — CLIP APPLIER ETHICON LIGAMAX 5MM: Type: IMPLANTABLE DEVICE | Status: FUNCTIONAL

## 2024-04-17 RX ORDER — HYDROMORPHONE HYDROCHLORIDE 2 MG/ML
0.2 INJECTION INTRAMUSCULAR; INTRAVENOUS; SUBCUTANEOUS
Refills: 0 | Status: DISCONTINUED | OUTPATIENT
Start: 2024-04-17 | End: 2024-04-17

## 2024-04-17 RX ORDER — POTASSIUM CHLORIDE 20 MEQ
40 PACKET (EA) ORAL ONCE
Refills: 0 | Status: COMPLETED | OUTPATIENT
Start: 2024-04-17 | End: 2024-04-17

## 2024-04-17 RX ORDER — ONDANSETRON 8 MG/1
4 TABLET, FILM COATED ORAL EVERY 6 HOURS
Refills: 0 | Status: DISCONTINUED | OUTPATIENT
Start: 2024-04-17 | End: 2024-04-18

## 2024-04-17 RX ORDER — CEFTRIAXONE 500 MG/1
1000 INJECTION, POWDER, FOR SOLUTION INTRAMUSCULAR; INTRAVENOUS EVERY 24 HOURS
Refills: 0 | Status: DISCONTINUED | OUTPATIENT
Start: 2024-04-17 | End: 2024-04-18

## 2024-04-17 RX ORDER — ONDANSETRON 8 MG/1
4 TABLET, FILM COATED ORAL ONCE
Refills: 0 | Status: COMPLETED | OUTPATIENT
Start: 2024-04-17 | End: 2024-04-17

## 2024-04-17 RX ORDER — METRONIDAZOLE 500 MG
500 TABLET ORAL EVERY 12 HOURS
Refills: 0 | Status: DISCONTINUED | OUTPATIENT
Start: 2024-04-17 | End: 2024-04-18

## 2024-04-17 RX ORDER — PANTOPRAZOLE SODIUM 20 MG/1
40 TABLET, DELAYED RELEASE ORAL DAILY
Refills: 0 | Status: DISCONTINUED | OUTPATIENT
Start: 2024-04-17 | End: 2024-04-18

## 2024-04-17 RX ORDER — ACETAMINOPHEN 500 MG
1000 TABLET ORAL ONCE
Refills: 0 | Status: COMPLETED | OUTPATIENT
Start: 2024-04-17 | End: 2024-04-17

## 2024-04-17 RX ORDER — HEPARIN SODIUM 5000 [USP'U]/ML
5000 INJECTION INTRAVENOUS; SUBCUTANEOUS EVERY 8 HOURS
Refills: 0 | Status: DISCONTINUED | OUTPATIENT
Start: 2024-04-17 | End: 2024-04-18

## 2024-04-17 RX ORDER — HYDROMORPHONE HYDROCHLORIDE 2 MG/ML
0.25 INJECTION INTRAMUSCULAR; INTRAVENOUS; SUBCUTANEOUS ONCE
Refills: 0 | Status: DISCONTINUED | OUTPATIENT
Start: 2024-04-17 | End: 2024-04-17

## 2024-04-17 RX ORDER — POTASSIUM CHLORIDE 20 MEQ
10 PACKET (EA) ORAL
Refills: 0 | Status: COMPLETED | OUTPATIENT
Start: 2024-04-17 | End: 2024-04-17

## 2024-04-17 RX ORDER — LANOLIN ALCOHOL/MO/W.PET/CERES
10 CREAM (GRAM) TOPICAL ONCE
Refills: 0 | Status: COMPLETED | OUTPATIENT
Start: 2024-04-17 | End: 2024-04-18

## 2024-04-17 RX ORDER — SODIUM CHLORIDE 9 MG/ML
1000 INJECTION, SOLUTION INTRAVENOUS
Refills: 0 | Status: DISCONTINUED | OUTPATIENT
Start: 2024-04-17 | End: 2024-04-18

## 2024-04-17 RX ADMIN — Medication 400 MILLIGRAM(S): at 20:39

## 2024-04-17 RX ADMIN — HYDROMORPHONE HYDROCHLORIDE 0.25 MILLIGRAM(S): 2 INJECTION INTRAMUSCULAR; INTRAVENOUS; SUBCUTANEOUS at 23:15

## 2024-04-17 RX ADMIN — SODIUM CHLORIDE 110 MILLILITER(S): 9 INJECTION, SOLUTION INTRAVENOUS at 00:00

## 2024-04-17 RX ADMIN — HEPARIN SODIUM 5000 UNIT(S): 5000 INJECTION INTRAVENOUS; SUBCUTANEOUS at 06:07

## 2024-04-17 RX ADMIN — Medication 100 MILLIEQUIVALENT(S): at 09:50

## 2024-04-17 RX ADMIN — HEPARIN SODIUM 5000 UNIT(S): 5000 INJECTION INTRAVENOUS; SUBCUTANEOUS at 13:34

## 2024-04-17 RX ADMIN — Medication 40 MILLIEQUIVALENT(S): at 11:12

## 2024-04-17 RX ADMIN — Medication 1000 MILLIGRAM(S): at 21:19

## 2024-04-17 RX ADMIN — HEPARIN SODIUM 5000 UNIT(S): 5000 INJECTION INTRAVENOUS; SUBCUTANEOUS at 22:58

## 2024-04-17 RX ADMIN — ONDANSETRON 4 MILLIGRAM(S): 8 TABLET, FILM COATED ORAL at 20:31

## 2024-04-17 RX ADMIN — Medication 100 MILLIGRAM(S): at 20:42

## 2024-04-17 RX ADMIN — PANTOPRAZOLE SODIUM 40 MILLIGRAM(S): 20 TABLET, DELAYED RELEASE ORAL at 11:13

## 2024-04-17 RX ADMIN — Medication 100 MILLIEQUIVALENT(S): at 07:48

## 2024-04-17 RX ADMIN — CEFTRIAXONE 100 MILLIGRAM(S): 500 INJECTION, POWDER, FOR SOLUTION INTRAMUSCULAR; INTRAVENOUS at 20:42

## 2024-04-17 RX ADMIN — HYDROMORPHONE HYDROCHLORIDE 0.25 MILLIGRAM(S): 2 INJECTION INTRAMUSCULAR; INTRAVENOUS; SUBCUTANEOUS at 22:57

## 2024-04-17 RX ADMIN — Medication 100 MILLIEQUIVALENT(S): at 11:12

## 2024-04-17 NOTE — PRE-ANESTHESIA EVALUATION ADULT - NSPROPOSEDPROCEDFT_GEN_ALL_CORE
Patient BIBA: Patient diagnosed with pneumonia within past 2 weeks. Released from hospital on Wednesday: Patient reports hard to breath. Son reports Breathing difficulties started at 6pm , per son oxygenator not working, patient dependant on oxygen 2lpm. No retractions.
Laparoscopic cholecystectomy

## 2024-04-17 NOTE — PROGRESS NOTE ADULT - SUBJECTIVE AND OBJECTIVE BOX
Patient is a 68y old  Female who presents with a chief complaint of   INTERVAL EVENTS:    SUBJECTIVE:  Patient was seen and examined at bedside.    Review of systems: No fever, chills, dizziness, HA, Changes in vision, CP, dyspnea, nausea or vomiting, dysuria, changes in bowel movements, LE edema. Rest of 12 point Review of systems negative unless otherwise documented elsewhere in note.     Diet, NPO after Midnight:      NPO Start Date: 16-Apr-2024,   NPO Start Time: 23:59 (04-16-24 @ 15:14) [Active]  Diet, Clear Liquid (04-16-24 @ 15:13) [Active]      MEDICATIONS:  MEDICATIONS  (STANDING):  dextrose 5% + sodium chloride 0.45%. 1000 milliLiter(s) (110 mL/Hr) IV Continuous <Continuous>  heparin   Injectable 5000 Unit(s) SubCutaneous every 8 hours  pantoprazole  Injectable 40 milliGRAM(s) IV Push daily    MEDICATIONS  (PRN):  acetaminophen   IVPB .. 1000 milliGRAM(s) IV Intermittent once PRN Mild Pain (1 - 3), Moderate Pain (4 - 6)  ondansetron Injectable 4 milliGRAM(s) IV Push every 6 hours PRN Nausea      Allergies    No Known Allergies    Intolerances        OBJECTIVE:  Vital Signs Last 24 Hrs  T(C): 36.4 (17 Apr 2024 12:41), Max: 37.7 (16 Apr 2024 23:44)  T(F): 97.6 (17 Apr 2024 12:41), Max: 99.9 (16 Apr 2024 23:44)  HR: 63 (17 Apr 2024 12:41) (62 - 87)  BP: 128/83 (17 Apr 2024 12:41) (108/68 - 147/84)  BP(mean): --  RR: 17 (17 Apr 2024 12:41) (17 - 18)  SpO2: 97% (17 Apr 2024 12:41) (92% - 97%)    Parameters below as of 17 Apr 2024 12:41  Patient On (Oxygen Delivery Method): room air      I&O's Summary    16 Apr 2024 07:01  -  17 Apr 2024 07:00  --------------------------------------------------------  IN: 880 mL / OUT: 1600 mL / NET: -720 mL    17 Apr 2024 07:01  -  17 Apr 2024 12:50  --------------------------------------------------------  IN: 0 mL / OUT: 275 mL / NET: -275 mL        PHYSICAL EXAM:  Gen: Reclining in bed at time of exam, appears stated age  HEENT: NCAT, MMM, clear OP  Neck: supple, trachea at midline  CV: RRR, +S1/S2  Pulm: adequate respiratory effort, no increase in work of breathing  Abd: soft, NTND  Skin: warm and dry,   Ext: WWP, no LE edema  Neuro: AOx3, speaking in full sentences  Psych: affect and behavior appropriate, pleasant at time of interview    LABS:                        10.9   10.01 )-----------( 383      ( 17 Apr 2024 05:30 )             33.2     04-17    135  |  100  |  6<L>  ----------------------------<  133<H>  3.3<L>   |  27  |  0.67    Ca    9.1      17 Apr 2024 05:30  Phos  3.7     04-17  Mg     2.3     04-17    TPro  5.8<L>  /  Alb  3.1<L>  /  TBili  0.6  /  DBili  0.2  /  AST  19  /  ALT  59<H>  /  AlkPhos  321<H>  04-17    LIVER FUNCTIONS - ( 17 Apr 2024 05:30 )  Alb: 3.1 g/dL / Pro: 5.8 g/dL / ALK PHOS: 321 U/L / ALT: 59 U/L / AST: 19 U/L / GGT: x             CAPILLARY BLOOD GLUCOSE        Urinalysis Basic - ( 17 Apr 2024 05:30 )    Color: x / Appearance: x / SG: x / pH: x  Gluc: 133 mg/dL / Ketone: x  / Bili: x / Urobili: x   Blood: x / Protein: x / Nitrite: x   Leuk Esterase: x / RBC: x / WBC x   Sq Epi: x / Non Sq Epi: x / Bacteria: x        MICRODATA:    Urinalysis with Rflx Culture (collected 16 Apr 2024 07:30)        RADIOLOGY/OTHER STUDIES:     SUBJECTIVE:  Patient was seen and examined at bedside. Reports abdominal pain controlled, awaiting OR. No other complaints or events repored.    Review of systems: No fever, chills, dizziness, HA, Changes in vision, CP, dyspnea, nausea or vomiting, dysuria, changes in bowel movements, LE edema. Rest of 12 point Review of systems negative unless otherwise documented elsewhere in note.     Diet, NPO after Midnight:      NPO Start Date: 16-Apr-2024,   NPO Start Time: 23:59 (04-16-24 @ 15:14) [Active]  Diet, Clear Liquid (04-16-24 @ 15:13) [Active]      MEDICATIONS:  MEDICATIONS  (STANDING):  dextrose 5% + sodium chloride 0.45%. 1000 milliLiter(s) (110 mL/Hr) IV Continuous <Continuous>  heparin   Injectable 5000 Unit(s) SubCutaneous every 8 hours  pantoprazole  Injectable 40 milliGRAM(s) IV Push daily    MEDICATIONS  (PRN):  acetaminophen   IVPB .. 1000 milliGRAM(s) IV Intermittent once PRN Mild Pain (1 - 3), Moderate Pain (4 - 6)  ondansetron Injectable 4 milliGRAM(s) IV Push every 6 hours PRN Nausea      Allergies    No Known Allergies    Intolerances        OBJECTIVE:  Vital Signs Last 24 Hrs  T(C): 36.4 (17 Apr 2024 12:41), Max: 37.7 (16 Apr 2024 23:44)  T(F): 97.6 (17 Apr 2024 12:41), Max: 99.9 (16 Apr 2024 23:44)  HR: 63 (17 Apr 2024 12:41) (62 - 87)  BP: 128/83 (17 Apr 2024 12:41) (108/68 - 147/84)  BP(mean): --  RR: 17 (17 Apr 2024 12:41) (17 - 18)  SpO2: 97% (17 Apr 2024 12:41) (92% - 97%)    Parameters below as of 17 Apr 2024 12:41  Patient On (Oxygen Delivery Method): room air      I&O's Summary    16 Apr 2024 07:01  -  17 Apr 2024 07:00  --------------------------------------------------------  IN: 880 mL / OUT: 1600 mL / NET: -720 mL    17 Apr 2024 07:01  -  17 Apr 2024 12:50  --------------------------------------------------------  IN: 0 mL / OUT: 275 mL / NET: -275 mL        PHYSICAL EXAM:  General: AOX3, NAD, sitting in chair, speaking in full sentences, no labored breathing on RA  HEENT: AT/NC, no facial asymmetry  Lungs: no labored breathing  Abdomen: soft, mild tenderness  Extremities:  warm, no edema, no focal deficit     LABS:                        10.9   10.01 )-----------( 383      ( 17 Apr 2024 05:30 )             33.2     04-17    135  |  100  |  6<L>  ----------------------------<  133<H>  3.3<L>   |  27  |  0.67    Ca    9.1      17 Apr 2024 05:30  Phos  3.7     04-17  Mg     2.3     04-17    TPro  5.8<L>  /  Alb  3.1<L>  /  TBili  0.6  /  DBili  0.2  /  AST  19  /  ALT  59<H>  /  AlkPhos  321<H>  04-17    LIVER FUNCTIONS - ( 17 Apr 2024 05:30 )  Alb: 3.1 g/dL / Pro: 5.8 g/dL / ALK PHOS: 321 U/L / ALT: 59 U/L / AST: 19 U/L / GGT: x             CAPILLARY BLOOD GLUCOSE        Urinalysis Basic - ( 17 Apr 2024 05:30 )    Color: x / Appearance: x / SG: x / pH: x  Gluc: 133 mg/dL / Ketone: x  / Bili: x / Urobili: x   Blood: x / Protein: x / Nitrite: x   Leuk Esterase: x / RBC: x / WBC x   Sq Epi: x / Non Sq Epi: x / Bacteria: x        MICRODATA:    Urinalysis with Rflx Culture (collected 16 Apr 2024 07:30)        RADIOLOGY/OTHER STUDIES:

## 2024-04-17 NOTE — PROGRESS NOTE ADULT - ASSESSMENT
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 Probably impacted calculi in the neck of GB. Small amount of pericholecystic fluid. Distal gallbladder is distended. Cannot rule out acute cholecystitis  pw with outpt LFT's AST//459 4 days PTA-- on admission 4/15 AST/ALT> 24/124 -- Today 20/78  ALP down from 453-->366  Pending HIDA results   NPO/IVF  hx of provoked DVT/PE in 2018 - which was treated and repeat scan were negative per pt   METS > 4 , garza JIMMY 0.0 %   Pending call back from outpt provider regarding results of recent echo - for final optimization; per Pt her stress test was canceled ( please obtain records from Dr. Alexander Covington [phone] )   rest of the management per primary team       HLD?   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

## 2024-04-17 NOTE — PROGRESS NOTE ADULT - ASSESSMENT
The patient is 68 year old with a PMHx/PSHx of HLD, breast cyst, varicose veins s/p stab phlebectomy, RFA of L GSV c/b DVT and RLL segmental PE s/p outpatient AC (off since 2019), presenting for 1 week of abdominal pain admitted for rule out cholecystitis.     NPO/IVF  Pain/nausea control prn  No Abx  SQH/SCDs/OOBA/IS  AM Labs  OR for lap eyal

## 2024-04-17 NOTE — PROGRESS NOTE ADULT - SUBJECTIVE AND OBJECTIVE BOX
SUBJECTIVE: Pt seen and examined by chief resident. Pt is doing well, resting comfortably on bed. Denies abdominal pain. Ambulating out of bed. Awaiting OR    Vital Signs Last 24 Hrs  T(C): 36.7 (2024 04:59), Max: 37.7 (2024 23:44)  T(F): 98 (2024 04:59), Max: 99.9 (2024 23:44)  HR: 62 (2024 04:59) (62 - 87)  BP: 108/68 (2024 04:59) (108/68 - 147/84)  BP(mean): --  RR: 18 (2024 04:59) (17 - 18)  SpO2: 97% (2024 04:59) (92% - 97%)    Parameters below as of 2024 04:59  Patient On (Oxygen Delivery Method): room air        I&O's Summary    2024 07:01  -  2024 07:00  --------------------------------------------------------  IN: 880 mL / OUT: 1600 mL / NET: -720 mL        Physical Exam:  General Appearance: Appears well, NAD  Pulmonary: Nonlabored breathing, no respiratory distress  Abdomen: Soft, nondisteded, nontender, no rebound or guarding  Extremities: WWP, SCD's in place     LABS:                        11.4   11.04 )-----------( 348      ( 2024 05:30 )             34.4     04-16    139  |  102  |  8   ----------------------------<  86  3.8   |  26  |  0.78    Ca    8.8      2024 05:30  Phos  4.0     04-16  Mg     2.2     04-16    TPro  6.3  /  Alb  3.3  /  TBili  0.8  /  DBili  0.3  /  AST  20  /  ALT  78<H>  /  AlkPhos  366<H>  -16    PT/INR - ( 15 Apr 2024 09:59 )   PT: 13.8 sec;   INR: 1.22          PTT - ( 15 Apr 2024 09:59 )  PTT:33.3 sec  Urinalysis Basic - ( 2024 07:30 )    Color: Yellow / Appearance: Clear / S.016 / pH: x  Gluc: x / Ketone: 80 mg/dL  / Bili: Negative / Urobili: 1.0 mg/dL   Blood: x / Protein: Negative mg/dL / Nitrite: Negative   Leuk Esterase: Trace / RBC: 2 /HPF / WBC 4 /HPF   Sq Epi: x / Non Sq Epi: 16 /HPF / Bacteria: Few /HPF

## 2024-04-17 NOTE — PRE-ANESTHESIA EVALUATION ADULT - NSANTHOSAYNRD_GEN_A_CORE
No. YAA screening performed.  STOP BANG Legend: 0-2 = LOW Risk; 3-4 = INTERMEDIATE Risk; 5-8 = HIGH Risk

## 2024-04-17 NOTE — BRIEF OPERATIVE NOTE - OPERATION/FINDINGS
Pt placed in reverse Trendelenburg w/ right side up. Attachments to transverse colon and GB were . GB fundus retracted superiorly over dome of liver. GB infundibulum retracted laterally towards RLQ exposing Calot’s triangle. Critical view of safety was then obtained. Cystic duct and artery clipped and divided. Peritoneal attachments btw GB & liver bed  w/ electrocautery. Multiple points of entry into gallbladder found w/ spillage of stones due to dense adhesive disease. Hemostasis achieved. No leakage of bile from cystic duct stump. GB was removed without incident. Spilled stones extracted and copious irrigation of RUQ.

## 2024-04-18 ENCOUNTER — TRANSCRIPTION ENCOUNTER (OUTPATIENT)
Age: 69
End: 2024-04-18

## 2024-04-18 VITALS
HEART RATE: 66 BPM | RESPIRATION RATE: 18 BRPM | OXYGEN SATURATION: 97 % | TEMPERATURE: 98 F | SYSTOLIC BLOOD PRESSURE: 146 MMHG | DIASTOLIC BLOOD PRESSURE: 82 MMHG

## 2024-04-18 LAB
ALBUMIN SERPL ELPH-MCNC: 3.4 G/DL — SIGNIFICANT CHANGE UP (ref 3.3–5)
ALP SERPL-CCNC: 304 U/L — HIGH (ref 40–120)
ALT FLD-CCNC: 44 U/L — SIGNIFICANT CHANGE UP (ref 10–45)
ANION GAP SERPL CALC-SCNC: 12 MMOL/L — SIGNIFICANT CHANGE UP (ref 5–17)
AST SERPL-CCNC: 21 U/L — SIGNIFICANT CHANGE UP (ref 10–40)
BILIRUB DIRECT SERPL-MCNC: 0.2 MG/DL — SIGNIFICANT CHANGE UP (ref 0–0.3)
BILIRUB INDIRECT FLD-MCNC: 0.2 MG/DL — SIGNIFICANT CHANGE UP (ref 0.2–1)
BILIRUB SERPL-MCNC: 0.4 MG/DL — SIGNIFICANT CHANGE UP (ref 0.2–1.2)
BUN SERPL-MCNC: 5 MG/DL — LOW (ref 7–23)
CALCIUM SERPL-MCNC: 8.8 MG/DL — SIGNIFICANT CHANGE UP (ref 8.4–10.5)
CHLORIDE SERPL-SCNC: 102 MMOL/L — SIGNIFICANT CHANGE UP (ref 96–108)
CO2 SERPL-SCNC: 24 MMOL/L — SIGNIFICANT CHANGE UP (ref 22–31)
CREAT SERPL-MCNC: 0.68 MG/DL — SIGNIFICANT CHANGE UP (ref 0.5–1.3)
EGFR: 94 ML/MIN/1.73M2 — SIGNIFICANT CHANGE UP
GLUCOSE SERPL-MCNC: 143 MG/DL — HIGH (ref 70–99)
GRAM STN FLD: ABNORMAL
HCT VFR BLD CALC: 33.5 % — LOW (ref 34.5–45)
HGB BLD-MCNC: 10.9 G/DL — LOW (ref 11.5–15.5)
MAGNESIUM SERPL-MCNC: 2 MG/DL — SIGNIFICANT CHANGE UP (ref 1.6–2.6)
MCHC RBC-ENTMCNC: 30.4 PG — SIGNIFICANT CHANGE UP (ref 27–34)
MCHC RBC-ENTMCNC: 32.5 GM/DL — SIGNIFICANT CHANGE UP (ref 32–36)
MCV RBC AUTO: 93.3 FL — SIGNIFICANT CHANGE UP (ref 80–100)
NRBC # BLD: 0 /100 WBCS — SIGNIFICANT CHANGE UP (ref 0–0)
PHOSPHATE SERPL-MCNC: 3.8 MG/DL — SIGNIFICANT CHANGE UP (ref 2.5–4.5)
PLATELET # BLD AUTO: 441 K/UL — HIGH (ref 150–400)
POTASSIUM SERPL-MCNC: 3.7 MMOL/L — SIGNIFICANT CHANGE UP (ref 3.5–5.3)
POTASSIUM SERPL-SCNC: 3.7 MMOL/L — SIGNIFICANT CHANGE UP (ref 3.5–5.3)
PROT SERPL-MCNC: 6.1 G/DL — SIGNIFICANT CHANGE UP (ref 6–8.3)
RBC # BLD: 3.59 M/UL — LOW (ref 3.8–5.2)
RBC # FLD: 12.1 % — SIGNIFICANT CHANGE UP (ref 10.3–14.5)
SODIUM SERPL-SCNC: 138 MMOL/L — SIGNIFICANT CHANGE UP (ref 135–145)
SPECIMEN SOURCE: SIGNIFICANT CHANGE UP
WBC # BLD: 16.11 K/UL — HIGH (ref 3.8–10.5)
WBC # FLD AUTO: 16.11 K/UL — HIGH (ref 3.8–10.5)

## 2024-04-18 RX ORDER — ACETAMINOPHEN 500 MG
1000 TABLET ORAL ONCE
Refills: 0 | Status: COMPLETED | OUTPATIENT
Start: 2024-04-18 | End: 2024-04-18

## 2024-04-18 RX ORDER — OXYCODONE HYDROCHLORIDE 5 MG/1
1 TABLET ORAL
Qty: 8 | Refills: 0
Start: 2024-04-18 | End: 2024-04-20

## 2024-04-18 RX ORDER — METRONIDAZOLE 500 MG
1 TABLET ORAL
Qty: 21 | Refills: 0
Start: 2024-04-18 | End: 2024-04-24

## 2024-04-18 RX ORDER — HYDROMORPHONE HYDROCHLORIDE 2 MG/ML
0.25 INJECTION INTRAMUSCULAR; INTRAVENOUS; SUBCUTANEOUS ONCE
Refills: 0 | Status: DISCONTINUED | OUTPATIENT
Start: 2024-04-18 | End: 2024-04-18

## 2024-04-18 RX ORDER — DOCUSATE SODIUM 100 MG
1 CAPSULE ORAL
Qty: 14 | Refills: 0
Start: 2024-04-18 | End: 2024-04-24

## 2024-04-18 RX ORDER — CEFPODOXIME PROXETIL 100 MG
1 TABLET ORAL
Qty: 14 | Refills: 0
Start: 2024-04-18 | End: 2024-04-24

## 2024-04-18 RX ORDER — POTASSIUM CHLORIDE 20 MEQ
40 PACKET (EA) ORAL ONCE
Refills: 0 | Status: COMPLETED | OUTPATIENT
Start: 2024-04-18 | End: 2024-04-18

## 2024-04-18 RX ADMIN — Medication 400 MILLIGRAM(S): at 09:51

## 2024-04-18 RX ADMIN — PANTOPRAZOLE SODIUM 40 MILLIGRAM(S): 20 TABLET, DELAYED RELEASE ORAL at 11:20

## 2024-04-18 RX ADMIN — HYDROMORPHONE HYDROCHLORIDE 0.25 MILLIGRAM(S): 2 INJECTION INTRAMUSCULAR; INTRAVENOUS; SUBCUTANEOUS at 02:32

## 2024-04-18 RX ADMIN — HEPARIN SODIUM 5000 UNIT(S): 5000 INJECTION INTRAVENOUS; SUBCUTANEOUS at 06:09

## 2024-04-18 RX ADMIN — HYDROMORPHONE HYDROCHLORIDE 0.25 MILLIGRAM(S): 2 INJECTION INTRAMUSCULAR; INTRAVENOUS; SUBCUTANEOUS at 02:50

## 2024-04-18 RX ADMIN — Medication 1000 MILLIGRAM(S): at 10:40

## 2024-04-18 RX ADMIN — Medication 10 MILLIGRAM(S): at 00:21

## 2024-04-18 RX ADMIN — Medication 40 MILLIEQUIVALENT(S): at 09:23

## 2024-04-18 RX ADMIN — HEPARIN SODIUM 5000 UNIT(S): 5000 INJECTION INTRAVENOUS; SUBCUTANEOUS at 14:56

## 2024-04-18 RX ADMIN — Medication 100 MILLIGRAM(S): at 06:10

## 2024-04-18 NOTE — PROGRESS NOTE ADULT - SUBJECTIVE AND OBJECTIVE BOX
Surgery Note    Pt comfortable without complaints, pain controlled. No bowel movement yet.  Denies CP, SOB, N/V, numbness/tingling     Vital Signs Last 24 Hrs  T(C): 36.9 (04-18-24 @ 05:07), Max: 36.9 (04-18-24 @ 05:07)  T(F): 98.5 (04-18-24 @ 05:07), Max: 98.5 (04-18-24 @ 05:07)  HR: 69 (04-18-24 @ 05:07) (69 - 69)  BP: 133/80 (04-18-24 @ 05:07) (133/80 - 133/80)  BP(mean): --  RR: 17 (04-18-24 @ 05:07) (17 - 17)  SpO2: 95% (04-18-24 @ 05:07) (95% - 95%)  I&O's Summary    16 Apr 2024 07:01  -  17 Apr 2024 07:00  --------------------------------------------------------  IN: 880 mL / OUT: 1600 mL / NET: -720 mL    17 Apr 2024 07:01  -  18 Apr 2024 06:57  --------------------------------------------------------  IN: 1820 mL / OUT: 1835 mL / NET: -15 mL        General: Pt Alert and oriented, NAD  Abdomen wnl; nontender to palpation/ nondistended                          10.9   10.01 )-----------( 383      ( 17 Apr 2024 05:30 )             33.2     04-17    135  |  100  |  6<L>  ----------------------------<  133<H>  3.3<L>   |  27  |  0.67    Ca    9.1      17 Apr 2024 05:30  Phos  3.7     04-17  Mg     2.3     04-17    TPro  5.8<L>  /  Alb  3.1<L>  /  TBili  0.6  /  DBili  0.2  /  AST  19  /  ALT  59<H>  /  AlkPhos  321<H>  04-17      A/P: 68yFemale POD# 1 s/p lap eyal  - Stable  - Pain Control  - CLD/IVF  - N/V control  - Pending BM/ encourage ambulation

## 2024-04-18 NOTE — PROGRESS NOTE ADULT - SUBJECTIVE AND OBJECTIVE BOX
General Surgery Post op Check    Pt seen and examined without complaints. Pain is controlled. Denies SOB/CP/N/V.     Vital Signs Last 24 Hrs  T(C): 36.6 (17 Apr 2024 23:36), Max: 36.7 (17 Apr 2024 04:59)  T(F): 97.9 (17 Apr 2024 23:36), Max: 98 (17 Apr 2024 04:59)  HR: 69 (17 Apr 2024 23:36) (62 - 79)  BP: 147/83 (17 Apr 2024 23:36) (108/68 - 153/84)  BP(mean): 112 (17 Apr 2024 21:15) (98 - 112)  RR: 18 (17 Apr 2024 23:36) (12 - 28)  SpO2: 96% (17 Apr 2024 23:36) (95% - 97%)    Parameters below as of 17 Apr 2024 23:36  Patient On (Oxygen Delivery Method): room air        I&O's Summary    16 Apr 2024 07:01  -  17 Apr 2024 07:00  --------------------------------------------------------  IN: 880 mL / OUT: 1600 mL / NET: -720 mL    17 Apr 2024 07:01  -  18 Apr 2024 00:56  --------------------------------------------------------  IN: 1070 mL / OUT: 1680 mL / NET: -610 mL      Physical Exam  Gen: NAD, A&Ox3  Pulm: No respiratory distress, no subcostal retractions  CV: RRR, no JVD  Abd: Soft, NT, ND, incisions cdi

## 2024-04-18 NOTE — PROGRESS NOTE ADULT - ASSESSMENT
The patient is 68 year old with a PMHx/PSHx of HLD, breast cyst, varicose veins s/p stab phlebectomy, RFA of L GSV c/b DVT and RLL segmental PE s/p outpatient AC (off since 2019), presenting for 1 week of abdominal pain admitted for rule out cholecystitis.     CLD/IVF  Pain/nausea control prn  SQH/SCDs/OOBA/IS  AM Labs

## 2024-04-18 NOTE — PROGRESS NOTE ADULT - SUBJECTIVE AND OBJECTIVE BOX
INTERVAL EVENTS:  POD 1 lap eyal    SUBJECTIVE:  Patient was seen and examined at bedside. Patient post op, reports pain controlled. No flatus, no BMs, denies other complaints. No other events reported.    Review of systems: No fever, chills, dizziness, HA, Changes in vision, CP, dyspnea, nausea or vomiting, dysuria, changes in bowel movements, LE edema. Rest of 12 point Review of systems negative unless otherwise documented elsewhere in note.     Diet, Regular:   Low Fat (LOWFAT) (04-18-24 @ 07:08) [Active]      MEDICATIONS:  MEDICATIONS  (STANDING):  acetaminophen   IVPB .. 1000 milliGRAM(s) IV Intermittent once  cefTRIAXone   IVPB 1000 milliGRAM(s) IV Intermittent every 24 hours  heparin   Injectable 5000 Unit(s) SubCutaneous every 8 hours  metroNIDAZOLE  IVPB 500 milliGRAM(s) IV Intermittent every 12 hours  pantoprazole  Injectable 40 milliGRAM(s) IV Push daily    MEDICATIONS  (PRN):  ondansetron Injectable 4 milliGRAM(s) IV Push every 6 hours PRN Nausea      Allergies    No Known Allergies    Intolerances        OBJECTIVE:  Vital Signs Last 24 Hrs  T(C): 36.7 (18 Apr 2024 08:35), Max: 36.9 (18 Apr 2024 05:07)  T(F): 98.1 (18 Apr 2024 08:35), Max: 98.5 (18 Apr 2024 05:07)  HR: 70 (18 Apr 2024 08:35) (63 - 79)  BP: 143/77 (18 Apr 2024 08:35) (128/83 - 153/84)  BP(mean): 112 (17 Apr 2024 21:15) (98 - 112)  RR: 18 (18 Apr 2024 08:35) (12 - 28)  SpO2: 96% (18 Apr 2024 08:35) (95% - 97%)    Parameters below as of 18 Apr 2024 08:35  Patient On (Oxygen Delivery Method): room air      I&O's Summary    17 Apr 2024 07:01  -  18 Apr 2024 07:00  --------------------------------------------------------  IN: 1820 mL / OUT: 1835 mL / NET: -15 mL        PHYSICAL EXAM:  General: AOX3, NAD, lying in bed, speaking in full sentences, no labored breathing on RA  HEENT: AT/NC, no facial asymmetry  Lungs: no labored breathing  Heart: RRR  Abdomen: soft, no tenderness, + BS, drain in place with few cc of serosanguinous fluid  Extremities: warm, no edema, no tenderness, no calf tenderness, no focal deficit     LABS:                        10.9   16.11 )-----------( 441      ( 18 Apr 2024 05:30 )             33.5     04-18    138  |  102  |  5<L>  ----------------------------<  143<H>  3.7   |  24  |  0.68    Ca    8.8      18 Apr 2024 05:30  Phos  3.8     04-18  Mg     2.0     04-18    TPro  6.1  /  Alb  3.4  /  TBili  0.4  /  DBili  0.2  /  AST  21  /  ALT  44  /  AlkPhos  304<H>  04-18    LIVER FUNCTIONS - ( 18 Apr 2024 05:30 )  Alb: 3.4 g/dL / Pro: 6.1 g/dL / ALK PHOS: 304 U/L / ALT: 44 U/L / AST: 21 U/L / GGT: x             CAPILLARY BLOOD GLUCOSE        Urinalysis Basic - ( 18 Apr 2024 05:30 )    Color: x / Appearance: x / SG: x / pH: x  Gluc: 143 mg/dL / Ketone: x  / Bili: x / Urobili: x   Blood: x / Protein: x / Nitrite: x   Leuk Esterase: x / RBC: x / WBC x   Sq Epi: x / Non Sq Epi: x / Bacteria: x        MICRODATA:    Culture - Body Fluid with Gram Stain (collected 17 Apr 2024 20:18)  Source: .Body Fluid 1. Gallbladder Bile Aspirate  Gram Stain (18 Apr 2024 06:10):    Numerous polymorphonuclear leukocytes seen per low power field    Few Gram Negative Rods seen per oil power field    Urinalysis with Rflx Culture (collected 16 Apr 2024 07:30)        RADIOLOGY/OTHER STUDIES:

## 2024-04-18 NOTE — PROGRESS NOTE ADULT - ASSESSMENT
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 and RLL segmental PE s/p outpatient AC (off since 2018), presented with abdominal pain and abnormal output labs, admitted for suspected acute cholecystitis /choledocholithiasis     Acute cholecystitis   Transaminitis  HIDA with acute cholecystitis, ps/p  lap eyal  ATB per primary team,, currently on Ceftriaxone/Metronidazole, monitor WBC  Pain control, IS, OOB  Diet advancement per primary team    Acute blood loss anemia   Hgb downtrended from time of admission  Hemoglobin: 10.9 g/dL (04-18-24 @ 05:30)  Hemoglobin: 10.9 g/dL (04-17-24 @ 05:30)  Hemoglobin: 11.4 g/dL (04-16-24 @ 05:30)  Hemoglobin: 12.0 g/dL (04-15-24 @ 09:59)  No signs of bleeding, continue to monitor Hb    DVT ppx: SQH

## 2024-04-18 NOTE — DISCHARGE NOTE NURSING/CASE MANAGEMENT/SOCIAL WORK - PATIENT PORTAL LINK FT
You can access the FollowMyHealth Patient Portal offered by [facility] by registering at the following website: [email or web address]. By joining [facility]’s FollowMyHealth portal, you will also be able to view your health information using other applications (apps) compatible with our system.

## 2024-04-19 LAB
-  AMOXICILLIN/CLAVULANIC ACID: SIGNIFICANT CHANGE UP
-  AMPICILLIN/SULBACTAM: SIGNIFICANT CHANGE UP
-  AMPICILLIN: SIGNIFICANT CHANGE UP
-  CEFAZOLIN: SIGNIFICANT CHANGE UP
-  CEFEPIME: SIGNIFICANT CHANGE UP
-  CEFOXITIN: SIGNIFICANT CHANGE UP
-  CEFTRIAXONE: SIGNIFICANT CHANGE UP
-  CIPROFLOXACIN: SIGNIFICANT CHANGE UP
-  GENTAMICIN: SIGNIFICANT CHANGE UP
-  LEVOFLOXACIN: SIGNIFICANT CHANGE UP
-  PIPERACILLIN/TAZOBACTAM: SIGNIFICANT CHANGE UP
-  TOBRAMYCIN: SIGNIFICANT CHANGE UP
-  TRIMETHOPRIM/SULFAMETHOXAZOLE: SIGNIFICANT CHANGE UP
METHOD TYPE: SIGNIFICANT CHANGE UP

## 2024-04-22 LAB
CULTURE RESULTS: ABNORMAL
ORGANISM # SPEC MICROSCOPIC CNT: ABNORMAL
ORGANISM # SPEC MICROSCOPIC CNT: SIGNIFICANT CHANGE UP
SPECIMEN SOURCE: SIGNIFICANT CHANGE UP

## 2024-04-29 DIAGNOSIS — K80.01 CALCULUS OF GALLBLADDER WITH ACUTE CHOLECYSTITIS WITH OBSTRUCTION: ICD-10-CM

## 2024-04-29 DIAGNOSIS — E78.5 HYPERLIPIDEMIA, UNSPECIFIED: ICD-10-CM

## 2024-04-29 DIAGNOSIS — D62 ACUTE POSTHEMORRHAGIC ANEMIA: ICD-10-CM

## 2024-04-29 DIAGNOSIS — Z87.891 PERSONAL HISTORY OF NICOTINE DEPENDENCE: ICD-10-CM

## 2024-04-29 DIAGNOSIS — Z86.711 PERSONAL HISTORY OF PULMONARY EMBOLISM: ICD-10-CM

## 2024-04-29 DIAGNOSIS — Z86.718 PERSONAL HISTORY OF OTHER VENOUS THROMBOSIS AND EMBOLISM: ICD-10-CM

## 2024-04-29 DIAGNOSIS — R10.9 UNSPECIFIED ABDOMINAL PAIN: ICD-10-CM

## 2024-04-29 DIAGNOSIS — Z85.3 PERSONAL HISTORY OF MALIGNANT NEOPLASM OF BREAST: ICD-10-CM

## 2024-04-29 DIAGNOSIS — K82.A2 PERFORATION OF GALLBLADDER IN CHOLECYSTITIS: ICD-10-CM

## 2024-05-01 LAB — SURGICAL PATHOLOGY STUDY: SIGNIFICANT CHANGE UP

## 2024-05-05 ENCOUNTER — EMERGENCY (EMERGENCY)
Facility: HOSPITAL | Age: 69
LOS: 1 days | Discharge: HOME | End: 2024-05-05
Attending: STUDENT IN AN ORGANIZED HEALTH CARE EDUCATION/TRAINING PROGRAM | Admitting: STUDENT IN AN ORGANIZED HEALTH CARE EDUCATION/TRAINING PROGRAM
Payer: MEDICARE

## 2024-05-05 VITALS
WEIGHT: 160.06 LBS | TEMPERATURE: 98 F | RESPIRATION RATE: 16 BRPM | DIASTOLIC BLOOD PRESSURE: 82 MMHG | HEART RATE: 88 BPM | HEIGHT: 67 IN | SYSTOLIC BLOOD PRESSURE: 138 MMHG | OXYGEN SATURATION: 99 %

## 2024-05-05 VITALS
SYSTOLIC BLOOD PRESSURE: 147 MMHG | HEART RATE: 75 BPM | TEMPERATURE: 98 F | OXYGEN SATURATION: 96 % | DIASTOLIC BLOOD PRESSURE: 85 MMHG | RESPIRATION RATE: 16 BRPM

## 2024-05-05 DIAGNOSIS — K64.4 RESIDUAL HEMORRHOIDAL SKIN TAGS: ICD-10-CM

## 2024-05-05 DIAGNOSIS — Z90.49 ACQUIRED ABSENCE OF OTHER SPECIFIED PARTS OF DIGESTIVE TRACT: ICD-10-CM

## 2024-05-05 DIAGNOSIS — Z98.890 OTHER SPECIFIED POSTPROCEDURAL STATES: Chronic | ICD-10-CM

## 2024-05-05 DIAGNOSIS — Z86.718 PERSONAL HISTORY OF OTHER VENOUS THROMBOSIS AND EMBOLISM: ICD-10-CM

## 2024-05-05 DIAGNOSIS — Z87.19 PERSONAL HISTORY OF OTHER DISEASES OF THE DIGESTIVE SYSTEM: ICD-10-CM

## 2024-05-05 DIAGNOSIS — E78.5 HYPERLIPIDEMIA, UNSPECIFIED: ICD-10-CM

## 2024-05-05 DIAGNOSIS — K51.30 ULCERATIVE (CHRONIC) RECTOSIGMOIDITIS WITHOUT COMPLICATIONS: ICD-10-CM

## 2024-05-05 DIAGNOSIS — Z86.711 PERSONAL HISTORY OF PULMONARY EMBOLISM: ICD-10-CM

## 2024-05-05 DIAGNOSIS — R10.9 UNSPECIFIED ABDOMINAL PAIN: ICD-10-CM

## 2024-05-05 PROBLEM — Z86.79 PERSONAL HISTORY OF OTHER DISEASES OF THE CIRCULATORY SYSTEM: Chronic | Status: ACTIVE | Noted: 2024-04-15

## 2024-05-05 LAB
ALBUMIN SERPL ELPH-MCNC: 4.2 G/DL — SIGNIFICANT CHANGE UP (ref 3.3–5)
ALP SERPL-CCNC: 153 U/L — HIGH (ref 40–120)
ALT FLD-CCNC: 16 U/L — SIGNIFICANT CHANGE UP (ref 10–45)
ANION GAP SERPL CALC-SCNC: 10 MMOL/L — SIGNIFICANT CHANGE UP (ref 5–17)
APPEARANCE UR: CLEAR — SIGNIFICANT CHANGE UP
AST SERPL-CCNC: 18 U/L — SIGNIFICANT CHANGE UP (ref 10–40)
BASOPHILS # BLD AUTO: 0.05 K/UL — SIGNIFICANT CHANGE UP (ref 0–0.2)
BASOPHILS NFR BLD AUTO: 0.5 % — SIGNIFICANT CHANGE UP (ref 0–2)
BILIRUB SERPL-MCNC: 0.3 MG/DL — SIGNIFICANT CHANGE UP (ref 0.2–1.2)
BILIRUB UR-MCNC: NEGATIVE — SIGNIFICANT CHANGE UP
BUN SERPL-MCNC: 11 MG/DL — SIGNIFICANT CHANGE UP (ref 7–23)
CALCIUM SERPL-MCNC: 9.2 MG/DL — SIGNIFICANT CHANGE UP (ref 8.4–10.5)
CHLORIDE SERPL-SCNC: 102 MMOL/L — SIGNIFICANT CHANGE UP (ref 96–108)
CO2 SERPL-SCNC: 25 MMOL/L — SIGNIFICANT CHANGE UP (ref 22–31)
COLOR SPEC: YELLOW — SIGNIFICANT CHANGE UP
CREAT SERPL-MCNC: 0.78 MG/DL — SIGNIFICANT CHANGE UP (ref 0.5–1.3)
DIFF PNL FLD: NEGATIVE — SIGNIFICANT CHANGE UP
EGFR: 82 ML/MIN/1.73M2 — SIGNIFICANT CHANGE UP
EOSINOPHIL # BLD AUTO: 0.16 K/UL — SIGNIFICANT CHANGE UP (ref 0–0.5)
EOSINOPHIL NFR BLD AUTO: 1.6 % — SIGNIFICANT CHANGE UP (ref 0–6)
GLUCOSE SERPL-MCNC: 96 MG/DL — SIGNIFICANT CHANGE UP (ref 70–99)
GLUCOSE UR QL: NEGATIVE MG/DL — SIGNIFICANT CHANGE UP
HCT VFR BLD CALC: 40.1 % — SIGNIFICANT CHANGE UP (ref 34.5–45)
HGB BLD-MCNC: 12.8 G/DL — SIGNIFICANT CHANGE UP (ref 11.5–15.5)
IMM GRANULOCYTES NFR BLD AUTO: 0.4 % — SIGNIFICANT CHANGE UP (ref 0–0.9)
KETONES UR-MCNC: NEGATIVE MG/DL — SIGNIFICANT CHANGE UP
LACTATE SERPL-SCNC: 1.6 MMOL/L — SIGNIFICANT CHANGE UP (ref 0.5–2)
LEUKOCYTE ESTERASE UR-ACNC: NEGATIVE — SIGNIFICANT CHANGE UP
LIDOCAIN IGE QN: 48 U/L — SIGNIFICANT CHANGE UP (ref 7–60)
LYMPHOCYTES # BLD AUTO: 2.11 K/UL — SIGNIFICANT CHANGE UP (ref 1–3.3)
LYMPHOCYTES # BLD AUTO: 21.1 % — SIGNIFICANT CHANGE UP (ref 13–44)
MCHC RBC-ENTMCNC: 30.5 PG — SIGNIFICANT CHANGE UP (ref 27–34)
MCHC RBC-ENTMCNC: 31.9 GM/DL — LOW (ref 32–36)
MCV RBC AUTO: 95.5 FL — SIGNIFICANT CHANGE UP (ref 80–100)
MONOCYTES # BLD AUTO: 0.89 K/UL — SIGNIFICANT CHANGE UP (ref 0–0.9)
MONOCYTES NFR BLD AUTO: 8.9 % — SIGNIFICANT CHANGE UP (ref 2–14)
NEUTROPHILS # BLD AUTO: 6.74 K/UL — SIGNIFICANT CHANGE UP (ref 1.8–7.4)
NEUTROPHILS NFR BLD AUTO: 67.5 % — SIGNIFICANT CHANGE UP (ref 43–77)
NITRITE UR-MCNC: NEGATIVE — SIGNIFICANT CHANGE UP
NRBC # BLD: 0 /100 WBCS — SIGNIFICANT CHANGE UP (ref 0–0)
PH UR: 5.5 — SIGNIFICANT CHANGE UP (ref 5–8)
PLATELET # BLD AUTO: 374 K/UL — SIGNIFICANT CHANGE UP (ref 150–400)
POTASSIUM SERPL-MCNC: 4 MMOL/L — SIGNIFICANT CHANGE UP (ref 3.5–5.3)
POTASSIUM SERPL-SCNC: 4 MMOL/L — SIGNIFICANT CHANGE UP (ref 3.5–5.3)
PROT SERPL-MCNC: 7 G/DL — SIGNIFICANT CHANGE UP (ref 6–8.3)
PROT UR-MCNC: NEGATIVE MG/DL — SIGNIFICANT CHANGE UP
RBC # BLD: 4.2 M/UL — SIGNIFICANT CHANGE UP (ref 3.8–5.2)
RBC # FLD: 12.9 % — SIGNIFICANT CHANGE UP (ref 10.3–14.5)
SODIUM SERPL-SCNC: 137 MMOL/L — SIGNIFICANT CHANGE UP (ref 135–145)
SP GR SPEC: 1.02 — SIGNIFICANT CHANGE UP (ref 1–1.03)
UROBILINOGEN FLD QL: 0.2 MG/DL — SIGNIFICANT CHANGE UP (ref 0.2–1)
WBC # BLD: 9.99 K/UL — SIGNIFICANT CHANGE UP (ref 3.8–10.5)
WBC # FLD AUTO: 9.99 K/UL — SIGNIFICANT CHANGE UP (ref 3.8–10.5)

## 2024-05-05 RX ORDER — CIPROFLOXACIN LACTATE 400MG/40ML
500 VIAL (ML) INTRAVENOUS ONCE
Refills: 0 | Status: COMPLETED | OUTPATIENT
Start: 2024-05-05 | End: 2024-05-05

## 2024-05-05 RX ORDER — CIPROFLOXACIN LACTATE 400MG/40ML
1 VIAL (ML) INTRAVENOUS
Qty: 20 | Refills: 0
Start: 2024-05-05 | End: 2024-05-14

## 2024-05-05 RX ORDER — PANTOPRAZOLE SODIUM 20 MG/1
80 TABLET, DELAYED RELEASE ORAL ONCE
Refills: 0 | Status: COMPLETED | OUTPATIENT
Start: 2024-05-05 | End: 2024-05-05

## 2024-05-05 RX ORDER — IOHEXOL 300 MG/ML
30 INJECTION, SOLUTION INTRAVENOUS ONCE
Refills: 0 | Status: COMPLETED | OUTPATIENT
Start: 2024-05-05 | End: 2024-05-05

## 2024-05-05 RX ORDER — METRONIDAZOLE 500 MG
500 TABLET ORAL ONCE
Refills: 0 | Status: COMPLETED | OUTPATIENT
Start: 2024-05-05 | End: 2024-05-05

## 2024-05-05 RX ORDER — METRONIDAZOLE 500 MG
1 TABLET ORAL
Qty: 30 | Refills: 0
Start: 2024-05-05 | End: 2024-05-14

## 2024-05-05 RX ADMIN — IOHEXOL 30 MILLILITER(S): 300 INJECTION, SOLUTION INTRAVENOUS at 10:53

## 2024-05-05 RX ADMIN — Medication 500 MILLIGRAM(S): at 13:33

## 2024-05-05 RX ADMIN — PANTOPRAZOLE SODIUM 80 MILLIGRAM(S): 20 TABLET, DELAYED RELEASE ORAL at 10:53

## 2024-05-05 NOTE — ED PROVIDER NOTE - PHYSICAL EXAMINATION
Gen - NAD; well-appearing; A+Ox3   HEENT - NCAT, EOMI, moist mucous membranes  Neck - supple  Resp - CTAB, no increased WOB  CV -  RRR, no m/r/g  Abd - soft, NT, ND; no guarding or rebound  MSK - FROM of b/l UE and LE, no gross deformities  Extrem - no LE edema  Neuro - no focal motor or sensation deficits  Skin - warm, well perfused  Rectal (chaperone RN Faustino) - tiny soft nontender ext hemorrhoid, otherwise normal exam--no blood or stool within rectal vault, no appreciable perirectal/anal abscess

## 2024-05-05 NOTE — ED PROVIDER NOTE - PATIENT PORTAL LINK FT
You can access the FollowMyHealth Patient Portal offered by [facility] by registering at the following website: [email or web address]. By joining [employer]’s FollowMyHealth portal, you will also be able to view your health information using other applications (apps) compatible with our system.

## 2024-05-05 NOTE — ED ADULT TRIAGE NOTE - CHIEF COMPLAINT QUOTE
Pt presents to ED C/O sharp abd cramping, rectal bleeding with clots and mucous in stool S/P cholecystectomy x 3 weeks ago. Hx diverticulitis, gastroenteritis, hemorrhoids.

## 2024-05-05 NOTE — ED PROVIDER NOTE - OBJECTIVE STATEMENT
68 year old female with history of HLD, breast cyst, varicose veins s/p stab phlebectomy, RFA of L GSV c/b DVT and RLL segmental PE s/p outpatient AC (off since 2019), recent admission for cholecystectomy (4/17), presenting with 68 year old female with history of HLD, breast cyst, varicose veins s/p stab phlebectomy, RFA of L GSV c/b DVT and RLL segmental PE s/p outpatient AC (off since 2019), hemorrhoids, diverticulitis, recent admission for cholecystectomy (4/17), presenting with abdominal pain and bloody stools x 2-3d. States she has had burning and cramping quality lower abdominal pain associated with intermittent dark stools as well as small blood clots mixed within her brown stools. Pain is worse when she is passing a BM. No fevers but has some subjective chills. No cp, sob, n/v/d, cough.

## 2024-05-05 NOTE — ED PROVIDER NOTE - NSFOLLOWUPINSTRUCTIONS_ED_ALL_ED_FT
You were seen in the Emergency Department for: colitis    You were prescribed to the pharmacy: ciprofloxacin and metronidazole (antibiotics)  Please follow the instructions on the container/label and ask your pharmacist for any questions/concerns.    For pain, you may take Tylenol (acetaminophen) 650 mg every 6 hours.    Please follow up with your primary physician. If you do not have a primary physician or specialist of your needs, please call [phone] to find one convenient for you. At this number you will be able to locate a provider who accepts your insurance, as well as locate the right specialist for your needs.    You should return to the Emergency Department if you feel any new/worsening/persistent symptoms including but not limited to: fever, chills, vomiting, chest pain, difficulty breathing, loss of consciousness, bleeding, uncontrolled pain, numbness/weakness of a body part

## 2024-05-05 NOTE — ED PROVIDER NOTE - CLINICAL SUMMARY MEDICAL DECISION MAKING FREE TEXT BOX
68 year old female with history of HLD, breast cyst, varicose veins s/p stab phlebectomy, RFA of L GSV c/b DVT and RLL segmental PE s/p outpatient AC (off since 2019), hemorrhoids, diverticulitis, recent admission for cholecystectomy (4/17), presenting with abdominal pain and bloody stools x 2-3d. Overall she is very well appearing here with good vitals. Abdominal and rectal exam reassuring but given her advanced age and recently surgical intervention would take precaution and obtain CT imaging to r/o acute intraabdominal process e.g. colitis vs diverticulitis vs perforation vs bleed vs abscess. Dispo pending work up and reassessment.

## 2024-05-05 NOTE — ED ADULT NURSE NOTE - OBJECTIVE STATEMENT
Presents for sharp lower abd pain x 3 days, worse p eating with urgent spasms and bowel movements- reports "sometimes there is stool or clots, mostly it's mucous." Denies fever/chills/congestion.     On assessment- AOx4, breathing even and unlabored on RA, no apparent distress, VSS in triage, able to speak in clear coherent sentences, steady gait unassisted, neuro intact with no apparent facial asymmetry, PERRLA.

## 2024-05-05 NOTE — ED PROVIDER NOTE - PROGRESS NOTE DETAILS
Abel Sharp MD: CT showing +proctocolitis. Will treat with cipro/flagyl course given +bloody stools. Labs/UA reassuring. DC with pmd f/u. Discussed all results with patient who is in agreement with plan.

## 2025-01-16 ENCOUNTER — APPOINTMENT (OUTPATIENT)
Dept: ENDOCRINOLOGY | Facility: CLINIC | Age: 70
End: 2025-01-16
Payer: MEDICARE

## 2025-01-16 VITALS
WEIGHT: 172 LBS | SYSTOLIC BLOOD PRESSURE: 122 MMHG | BODY MASS INDEX: 26.94 KG/M2 | HEART RATE: 66 BPM | DIASTOLIC BLOOD PRESSURE: 76 MMHG

## 2025-01-16 DIAGNOSIS — M81.0 AGE-RELATED OSTEOPOROSIS W/OUT CURRENT PATHOLOGICAL FRACTURE: ICD-10-CM

## 2025-01-16 PROCEDURE — 99205 OFFICE O/P NEW HI 60 MIN: CPT

## 2025-01-16 PROCEDURE — G2211 COMPLEX E/M VISIT ADD ON: CPT

## 2025-01-16 PROCEDURE — 36415 COLL VENOUS BLD VENIPUNCTURE: CPT

## 2025-01-16 RX ORDER — PSYLLIUM HUSK 0.4 G
1000-20 CAPSULE ORAL
Qty: 90 | Refills: 2 | Status: ACTIVE | COMMUNITY
Start: 2025-01-16 | End: 1900-01-01

## 2025-01-17 LAB
25(OH)D3 SERPL-MCNC: 21.1 NG/ML
ALBUMIN SERPL ELPH-MCNC: 4.3 G/DL
ALP BLD-CCNC: 105 U/L
ALT SERPL-CCNC: 18 U/L
ANION GAP SERPL CALC-SCNC: 13 MMOL/L
AST SERPL-CCNC: 20 U/L
BILIRUB SERPL-MCNC: 0.3 MG/DL
BUN SERPL-MCNC: 17 MG/DL
CALCIUM SERPL-MCNC: 9.6 MG/DL
CALCIUM SERPL-MCNC: 9.6 MG/DL
CHLORIDE SERPL-SCNC: 103 MMOL/L
CO2 SERPL-SCNC: 27 MMOL/L
CREAT SERPL-MCNC: 0.92 MG/DL
EGFR: 67 ML/MIN/1.73M2
GLUCOSE SERPL-MCNC: 97 MG/DL
HCT VFR BLD CALC: 46.9 %
HGB BLD-MCNC: 15.1 G/DL
MCHC RBC-ENTMCNC: 30.7 PG
MCHC RBC-ENTMCNC: 32.2 G/DL
MCV RBC AUTO: 95.3 FL
PARATHYROID HORMONE INTACT: 42 PG/ML
PLATELET # BLD AUTO: 407 K/UL
POTASSIUM SERPL-SCNC: 4.9 MMOL/L
PROT SERPL-MCNC: 6.8 G/DL
RBC # BLD: 4.92 M/UL
RBC # FLD: 12.6 %
SODIUM SERPL-SCNC: 143 MMOL/L
TSH SERPL-ACNC: 3.89 UIU/ML
WBC # FLD AUTO: 8.06 K/UL

## 2025-01-17 NOTE — PHYSICAL EXAM
[No Acute Distress] : no acute distress [Normal Sclera/Conjunctiva] : normal sclera/conjunctiva [Normal Outer Ear/Nose] : the ears and nose were normal in appearance [Thyroid Not Enlarged] : the thyroid was not enlarged [No Thyroid Nodules] : no palpable thyroid nodules [Clear to Auscultation] : lungs were clear to auscultation bilaterally [Normal Rate] : heart rate was normal [No Edema] : no peripheral edema [Soft] : abdomen soft [Kyphosis] : kyphosis present [No Stigmata of Cushings Syndrome] : no stigmata of Cushings Syndrome [Normal Gait] : normal gait [Normal Strength/Tone] : muscle strength and tone were normal [No Rash] : no rash [Normal Reflexes] : deep tendon reflexes were 2+ and symmetric [No Tremors] : no tremors [Oriented x3] : oriented to person, place, and time [Acanthosis Nigricans] : no acanthosis nigricans [de-identified] : Pt is edentulous. [de-identified] : mild kyphosis

## 2025-01-17 NOTE — RESULTS/DATA
[Hologic] : hologic [Other: ________] : [unfilled] [T-Score ___] : T-score: [unfilled] [FreeTextEntry4] : 10/01/24 [FreeTextEntry7] : Left [FreeTextEntry8] : Left [de-identified] : According to the WHO classification, the patient has osteoporosis.

## 2025-01-17 NOTE — END OF VISIT
[FreeTextEntry3] :  All medical record entries made by the Scribe were at my, Dr. Sher Palacio, direction and personally dictated by me on 01/16/2025. I have reviewed the chart and agree that the record accurately reflects my personal performance of the history, physical exam, assessment and plan. I have also personally directed, reviewed and agreed with the chart. [Time Spent: ___ minutes] : I have spent [unfilled] minutes of time on the encounter which excludes teaching and separately reported services.

## 2025-01-17 NOTE — PHYSICAL EXAM
[No Acute Distress] : no acute distress [Normal Sclera/Conjunctiva] : normal sclera/conjunctiva [Normal Outer Ear/Nose] : the ears and nose were normal in appearance [Thyroid Not Enlarged] : the thyroid was not enlarged [No Thyroid Nodules] : no palpable thyroid nodules [Clear to Auscultation] : lungs were clear to auscultation bilaterally [Normal Rate] : heart rate was normal [No Edema] : no peripheral edema [Soft] : abdomen soft [Kyphosis] : kyphosis present [No Stigmata of Cushings Syndrome] : no stigmata of Cushings Syndrome [Normal Gait] : normal gait [Normal Strength/Tone] : muscle strength and tone were normal [No Rash] : no rash [Normal Reflexes] : deep tendon reflexes were 2+ and symmetric [No Tremors] : no tremors [Oriented x3] : oriented to person, place, and time [Acanthosis Nigricans] : no acanthosis nigricans [de-identified] : Pt is edentulous. [de-identified] : mild kyphosis

## 2025-01-17 NOTE — HISTORY OF PRESENT ILLNESS
[FreeTextEntry1] : 69 year old F pt, with Hx of Osteoporosis dx 2023, presents today to establish endocrine care. Other PMHx: prediabetes No PMHx of: thyroid disorder, HTN, HLD, bone fractures, GERD PSHx: cholecystectomy 04/2024, Caesarean section 3x No FHx of: hip fractures. SHx: social EtOH, non-smoker NKDA 3 children, youngest 32  Menopause age 48   01/16/2025  CC: "I am feeling very well. Pt is edentulous and ambulates well.  Pt reports a few occasions of low vitamin D in the past. She also completed a colonoscopy that was unremarkable.  Pt was prescribed Alendronate 70 mg qw last year, but admits she does not always adhere to it. She reports a slight decrease in height.  Reviewed Labs: - 10/1/24 AP Lumbar L1, L3-L4 T-score -2.6, left femoral neck T-score 0, left femoral hip T-score 1.1  -09/19/24 A1c 6.1%, LDL-c 107  [Medications verified as per pt on 01/16/2025] Current Medications: Alendronate 70 mg qw (rx 2024) Medication modified/added this visit: vitamin D and calcium

## 2025-01-17 NOTE — DATA REVIEWED
[FreeTextEntry1] : Reviewed imaging: - 10/1/24 AP Lumbar L1, L3-L4 T-score -2.6, left femoral neck T-score 0, left total hip T-score 1.1  Reviewed labs:  -09/19/24 A1c 6.1%, LDL-c 107

## 2025-01-17 NOTE — RESULTS/DATA
[Hologic] : hologic [Other: ________] : [unfilled] [T-Score ___] : T-score: [unfilled] [FreeTextEntry4] : 10/01/24 [FreeTextEntry7] : Left [FreeTextEntry8] : Left [de-identified] : According to the WHO classification, the patient has osteoporosis.

## 2025-02-15 RX ORDER — ERGOCALCIFEROL 1.25 MG/1
1.25 MG CAPSULE, LIQUID FILLED ORAL
Qty: 4 | Refills: 1 | Status: ACTIVE | COMMUNITY
Start: 2025-02-15 | End: 1900-01-01

## 2025-02-27 PROBLEM — Z86.79 PERSONAL HISTORY OF OTHER DISEASES OF THE CIRCULATORY SYSTEM: Chronic | Status: ACTIVE | Noted: 2024-04-15

## 2025-02-28 ENCOUNTER — OUTPATIENT (OUTPATIENT)
Dept: OUTPATIENT SERVICES | Facility: HOSPITAL | Age: 70
LOS: 1 days | End: 2025-02-28
Payer: MEDICARE

## 2025-02-28 ENCOUNTER — APPOINTMENT (OUTPATIENT)
Dept: INFUSION THERAPY | Facility: CLINIC | Age: 70
End: 2025-02-28

## 2025-02-28 VITALS
HEART RATE: 58 BPM | TEMPERATURE: 98 F | OXYGEN SATURATION: 96 % | RESPIRATION RATE: 16 BRPM | SYSTOLIC BLOOD PRESSURE: 133 MMHG | DIASTOLIC BLOOD PRESSURE: 84 MMHG

## 2025-02-28 VITALS
HEIGHT: 65 IN | SYSTOLIC BLOOD PRESSURE: 134 MMHG | TEMPERATURE: 97 F | DIASTOLIC BLOOD PRESSURE: 83 MMHG | RESPIRATION RATE: 16 BRPM | WEIGHT: 175.93 LBS | OXYGEN SATURATION: 95 % | HEART RATE: 72 BPM

## 2025-02-28 DIAGNOSIS — M81.0 AGE-RELATED OSTEOPOROSIS WITHOUT CURRENT PATHOLOGICAL FRACTURE: ICD-10-CM

## 2025-02-28 DIAGNOSIS — Z98.890 OTHER SPECIFIED POSTPROCEDURAL STATES: Chronic | ICD-10-CM

## 2025-02-28 DIAGNOSIS — Z98.89 OTHER SPECIFIED POSTPROCEDURAL STATES: Chronic | ICD-10-CM

## 2025-02-28 PROCEDURE — 96365 THER/PROPH/DIAG IV INF INIT: CPT

## 2025-02-28 RX ORDER — ACETAMINOPHEN 500 MG/5ML
500 LIQUID (ML) ORAL ONCE
Refills: 0 | Status: COMPLETED | OUTPATIENT
Start: 2025-02-28 | End: 2025-02-28

## 2025-02-28 RX ORDER — ZOLEDRONIC ACID 0.05 MG/ML
5 INJECTION, SOLUTION INTRAVENOUS ONCE
Refills: 0 | Status: COMPLETED | OUTPATIENT
Start: 2025-02-28 | End: 2025-02-28

## 2025-02-28 RX ADMIN — Medication 500 MILLIGRAM(S): at 10:51

## 2025-02-28 RX ADMIN — ZOLEDRONIC ACID 200 MILLIGRAM(S): 0.05 INJECTION, SOLUTION INTRAVENOUS at 10:45

## 2025-02-28 RX ADMIN — ZOLEDRONIC ACID 5 MILLIGRAM(S): 0.05 INJECTION, SOLUTION INTRAVENOUS at 11:15

## 2025-02-28 RX ADMIN — Medication 500 MILLIGRAM(S): at 10:45

## 2025-03-28 NOTE — ED ADULT TRIAGE NOTE - WEIGHT IN KG
"3/28/2025    George Mccormickyaniken  1955  [id number]    Diagnosis  Chief Complaint    Void trial; Post-op       Patient presents for void trial s/p TRANSURETHRAL RESECTION OF PROSTATE (TURP) (Abdomen) on 3/14/2025 by Dr. Trivedi    Plan  Follow up as scheduled with AP  Educational handout provided and reviewed of s/s he may experience after catheter removal  ER precautions reviewed  Advised to contact the office in the meantime with any questions or concerns    Procedure Kemp removal/voiding trial    Kemp catheter removed after deflation of an intact balloon. Patient tolerated well. Encouraged patient to hydrate well and return this afternoon for post void residual. he knows he may return early if uncomfortable and unable to urinate. Patient agrees to this plan.    Patient returned this afternoon. Patient states able to void. Patient voided while in office. Bladder ultrasound performed and PVR measured 19 ml.    Recent Results (from the past 4 hours)   POCT Measure PVR    Collection Time: 03/28/25  2:45 PM   Result Value Ref Range    POST-VOID RESIDUAL VOLUME, ML POC 19 mL       Vitals:    03/28/25 0839   BP: 130/68   BP Location: Left arm   Patient Position: Sitting   Cuff Size: Adult   Pulse: 66   Weight: 97.1 kg (214 lb)   Height: 5' 8\" (1.727 m)             Kristi Malik, FRANCESN, RN  " 82.6

## 2025-07-08 NOTE — PATIENT PROFILE ADULT - NSTRANSFERBELONGINGSRESP_GEN_A_NUR
INFECTIOUS  DISEASE  OUTPATIENT CLINIC  NOTE   Subjective   Primary care provider: Kojo Covington M.D..     Reason for Follow Up:   Follow-up for   1. Type 2 diabetes mellitus with hyperglycemia, with long-term current use of insulin ([facility])  CULTURE WOUND W/ GRAM STAIN      2. Left foot infection  cephALEXin (KEFLEX) 500 MG Cap    CULTURE WOUND W/ GRAM STAIN    CBC WITH DIFFERENTIAL    Comp Metabolic Panel      3. Open wound of fourth toe of left foot, subsequent encounter  CBC WITH DIFFERENTIAL    Comp Metabolic Panel        HPI: Patient previously seen and treated by ID team as inpatient during hospital admission.   Donald Fuller is a 53 y.o. male with history of uncontrolled type 2 diabetes mellitus, prior left fifth toe amputation in February admitted on 4/20/2025 secondary to worsening left foot swelling and drainage of prior amputation site. X-ray revealed findings concerning for osteomyelitis. He is now status post left fifth ray amputation with wound VAC placement on 4/21/2025. Hospital course complicated by MSSA bacteremia.  Blood cultures x 2 on 4/20+ MSSA. Repeat blood cultures 1 out of 2 sets on 4/22-MSSA. Repeat blood cultures on 4/24-negative to date.  1 blood cultures that triggered a CO2 production but cultures have been negative.  Return to the OR on 4/23/2025 for repeat I&D and surgical wound closure. OR cultures-MSSA. Pathology-acute osteomyelitis. TTE negative for any valvular abnormalities however there was a semimobile echodensity at the junction of the aorta and brachiocephalic artery. CTA aorta was negative for aortic aneurysm or dissection.  Showed atherosclerotic calcification in the coronary arteries. S/P I&D secondary closure of surgical wound left foot amputation on 4/23/2025.  6-week course of IV ertapenem 1 g daily with end date of 6/5/2025  - MRI C/T and L-spine negative for osteomyelitis    6/24/25-patient following up after completion of his 6-week course of IV ertapenem  1 g daily which ended on 6/5/2025.  Left fifth ray amputation site CDI with no surrounding erythema, swelling.  Previous sutures have been removed.  Small amount of serous drainage on dressing.  No signs of active infection.  Today patient denies any nausea, vomiting, fever, chills, constipation or diarrhea.  Most recent labs reviewed from 6/2/2025 WBC 6.5, previous anemia resolved, platelets 236, neutrophils WNL, mild elevation in sed rate 27 and trending down, glucose 152, previous DOMINIC resolved, AST/ALT WNL, mild elevation alk phos 104.  CRP 0.33.  Education provided on signs and symptoms of recurrence of infection and when report to ER/call 911.  Referral to podiatry for ongoing footcare.  Follow-up with ID clinic as needed    7/8/25-patient returns to ID clinic with complaints of drainage from his surgical site that started a few days prior.  Patient noted increasing redness, swelling and purulent drainage from previous suture site along the left lateral foot.  He recently went to the emergency room prescribed p.o. Keflex which he has noticed some improvement in his wound.  X-ray of left foot completed in the emergency room with periosteal reaction is noted around the proximal diaphysis of the fourth digit. Osteomyelitis is a possibility.  Today he denies any nausea, vomiting, fever, chills, constipation or diarrhea.  Macerated wound edges along previous surgical wound.  Clinical photo uploaded.  I was able to express purulent fluid from lateral foot.  Wound culture sent to lab.  Given that patient is on Keflex this will most likely decrease culture yield but will hopefully rule out secondary infection.  Most recent labs reviewed from 7/6/2025.  Repeat labs in 1 week for continuous mind.  Recommend patient follow-up with [facility] ASAP for wound site eval.  May possibly need additional I&D or amputation for source control.     Current Antimicrobials:   Previous Antimicrobials: Ertapenem    Other  "Current Medications:  Home Medications   Medication Sig Taking? Last Dose Authorizing Provider   cephALEXin (KEFLEX) 500 MG Cap Take 2 Capsules by mouth 2 times a day for 14 days. Yes  CAROL HarrisonP.RSOILA   insulin glargine (LANTUS SOLOSTAR) 100 UNIT/ML Solution Pen-injector injection PEN Inject 30 Units under the skin every evening. Yes  SALVADOR Lal.P.RSOILA   insulin lispro 100 UNIT/ML SC SOPN injection PEN Inject 2-10 Units under the skin 3 times a day before meals. Use 2 units if blood sugar 150-200. 4 units if 200-250, 6 units if 250-300, 8 units if 300-350, 10 units if 350-400 Yes  SALVADOR Lal.P.R.SHELLIE.   Semaglutide,0.25 or 0.5MG/DOS, (OZEMPIC, 0.25 OR 0.5 MG/DOSE,) 2 MG/3ML Solution Pen-injector Inject 0.5 mg under the skin every 7 days. Yes  Arian Luna A.P.R.N.   Continuous Glucose Sensor (DEXCOM G7 SENSOR) Misc 1 Each every 10 days. Yes  SALVADOR Lal.P.RSOILA   acetaminophen (TYLENOL) 500 MG Tab Take 2 Tablets by mouth 3 times a day. Yes  Eric Douglass M.D.   cyclobenzaprine (FLEXERIL) 5 MG tablet Take 1 Tablet by mouth 3 times a day as needed for Muscle Spasms. Yes  Eric Douglass M.D.   atorvastatin (LIPITOR) 20 MG Tab Take 1 Tablet by mouth every evening. Yes  Mary Ram M.D.   pioglitazone (ACTOS) 15 MG Tab Take 1 Tablet by mouth every day. Yes  Mary Ram M.D.   Insulin Pen Needle 32 G x 4 mm Use one pen needle in pen device to inject insulin four times daily. Yes  Mary Ram M.D.   Insulin Syringe-Needle U-100 (INSULIN SYRINGE 1CC/31GX5/16\") 31G X 5/16\" 1 ML Misc Use as directed for SQ injection of insulin. Yes  Kojo Covington M.D.   hydrOXYzine HCl (ATARAX) 25 MG Tab TAKE 1 TABLET BY MOUTH THREE TIMES DAILY AS NEEDED FOR ITCHING OR ANXIETY. Yes  Kojo Covington M.D.   venlafaxine (EFFEXOR) 37.5 MG Tab Take 37.5 mg by mouth every day.   Physician Outpatient   losartan (COZAAR) 50 MG Tab Take 1 Tablet by mouth " every day.   Mary Ram M.D.   metformin (GLUCOPHAGE) 1000 MG tablet Take 1 Tablet by mouth 2 times a day with meals.  Patient not taking: Reported on 7/8/2025   Kojo Covington M.D.        PMH:  Past Medical History[1]  Past Surgical History[2]  History reviewed. No pertinent family history.  Social History     Socioeconomic History    Marital status: Single     Spouse name: Not on file    Number of children: Not on file    Years of education: Not on file    Highest education level: Bachelor's degree (e.g., BA, AB, BS)   Occupational History    Not on file   Tobacco Use    Smoking status: Never    Smokeless tobacco: Never   Vaping Use    Vaping status: Some Days    Substances: Nicotine   Substance and Sexual Activity    Alcohol use: Never    Drug use: Yes     Types: Oral     Comment: marijuanna gummies    Sexual activity: Never   Other Topics Concern    Not on file   Social History Narrative    Not on file     Social Drivers of Health     Financial Resource Strain: Low Risk  (8/25/2022)    Overall Financial Resource Strain (CARDIA)     Difficulty of Paying Living Expenses: Not hard at all   Food Insecurity: No Food Insecurity (4/21/2025)    Hunger Vital Sign     Worried About Running Out of Food in the Last Year: Never true     Ran Out of Food in the Last Year: Never true   Transportation Needs: No Transportation Needs (4/21/2025)    PRAPARE - Transportation     Lack of Transportation (Medical): No     Lack of Transportation (Non-Medical): No   Physical Activity: Inactive (8/25/2022)    Exercise Vital Sign     Days of Exercise per Week: 0 days     Minutes of Exercise per Session: 30 min   Stress: No Stress Concern Present (8/25/2022)    [employer] - Occupational Stress Questionnaire     Feeling of Stress : Not at all   Social Connections: Socially Isolated (8/25/2022)    Social Connection and Isolation Panel [NHANES]     Frequency of Communication with Friends and Family: Once  "a week     Frequency of Social Gatherings with Friends and Family: Never     Attends Christian Services: Never     Active Member of Clubs or Organizations: No     Attends Club or Organization Meetings: Never     Marital Status: Living with partner   Intimate Partner Violence: Not At Risk (4/21/2025)    Humiliation, Afraid, Rape, and Kick questionnaire     Fear of Current or Ex-Partner: No     Emotionally Abused: No     Physically Abused: No     Sexually Abused: No   Housing Stability: High Risk (4/21/2025)    Housing Stability Vital Sign     Unable to Pay for Housing in the Last Year: No     Number of Times Moved in the Last Year: 2     Homeless in the Last Year: No           Allergies/Intolerances:  Allergies[3]    ROS:   Review of Systems   Constitutional:  Negative for chills, fever, malaise/fatigue and weight loss.   HENT:  Negative for congestion and hearing loss.    Eyes:  Negative for blurred vision and double vision.   Respiratory:  Negative for cough, sputum production, shortness of breath and wheezing.    Cardiovascular:  Negative for chest pain and palpitations.   Gastrointestinal:  Negative for abdominal pain, diarrhea, nausea and vomiting.   Genitourinary:  Negative for dysuria.   Musculoskeletal:  Positive for joint pain (left foot). Negative for myalgias.   Skin:  Negative for itching and rash.   Neurological:  Positive for tingling and sensory change. Negative for dizziness, focal weakness and headaches.   Endo/Heme/Allergies:  Does not bruise/bleed easily.   Psychiatric/Behavioral:  The patient is not nervous/anxious.       ROS was reviewed and were negative except as above.    Objective    Most Recent Vital Signs:  BP 96/82   Pulse 85   Temp 36.8 °C (98.2 °F) (Temporal)   Resp 18   Ht 1.854 m (6' 1\")   Wt 95.4 kg (210 lb 6.4 oz)   SpO2 99%   BMI 27.76 kg/m²     Physical Exam:  Physical Exam  Vitals reviewed.   Constitutional:       Appearance: Normal appearance.   HENT:      Head: " Normocephalic and atraumatic.      Nose: Nose normal.      Mouth/Throat:      Mouth: Mucous membranes are moist.   Eyes:      Pupils: Pupils are equal, round, and reactive to light.   Cardiovascular:      Rate and Rhythm: Normal rate.   Pulmonary:      Effort: Pulmonary effort is normal.      Breath sounds: Normal breath sounds.   Abdominal:      Palpations: Abdomen is soft.   Musculoskeletal:         General: No tenderness.      Cervical back: Normal range of motion and neck supple.      Comments: 5th toe of left foot amputation site with mild erythema, drainage.  Macerated wound edges.  See clinical photo below       Skin:     General: Skin is warm and dry.      Coloration: Skin is not jaundiced.      Findings: No erythema or rash.   Neurological:      Mental Status: He is alert and oriented to person, place, and time.      Motor: No weakness.   Psychiatric:         Mood and Affect: Mood normal.         Behavior: Behavior normal.          Pertinent Lab/Imaging Results:  [unfilled]  @CMP@  WBC   Date/Time Value Ref Range Status   07/06/2025 01:26 AM 8.6 4.8 - 10.8 K/uL Final     RBC   Date/Time Value Ref Range Status   07/06/2025 01:26 AM 4.88 4.70 - 6.10 M/uL Final     Hemoglobin   Date/Time Value Ref Range Status   07/06/2025 01:26 AM 13.6 (L) 14.0 - 18.0 g/dL Final     Hematocrit   Date/Time Value Ref Range Status   07/06/2025 01:26 AM 41.7 (L) 42.0 - 52.0 % Final     MCV   Date/Time Value Ref Range Status   07/06/2025 01:26 AM 85.5 81.4 - 97.8 fL Final     MCH   Date/Time Value Ref Range Status   07/06/2025 01:26 AM 27.9 27.0 - 33.0 pg Final     MCHC   Date/Time Value Ref Range Status   07/06/2025 01:26 AM 32.6 32.3 - 36.5 g/dL Final     MPV   Date/Time Value Ref Range Status   07/06/2025 01:26 AM 9.5 9.0 - 12.9 fL Final      Sodium   Date/Time Value Ref Range Status   07/06/2025 01:26  135 - 145 mmol/L Final     Potassium   Date/Time Value Ref Range Status   07/06/2025 01:26 AM 4.4 3.6 - 5.5 mmol/L Final  "    Chloride   Date/Time Value Ref Range Status   07/06/2025 01:26  96 - 112 mmol/L Final     Co2   Date/Time Value Ref Range Status   07/06/2025 01:26 AM 24 20 - 33 mmol/L Final     Glucose   Date/Time Value Ref Range Status   07/06/2025 01:26  (H) 65 - 99 mg/dL Final     Bun   Date/Time Value Ref Range Status   07/06/2025 01:26 AM 27 (H) 8 - 22 mg/dL Final     Creatinine   Date/Time Value Ref Range Status   07/06/2025 01:26 AM 1.29 0.50 - 1.40 mg/dL Final     Bun-Creatinine Ratio   Date/Time Value Ref Range Status   01/06/2025 02:10 PM 13.1 10.0 - 20.0 Final     Alkaline Phosphatase   Date/Time Value Ref Range Status   07/06/2025 01:26 AM 96 30 - 99 U/L Final     AST(SGOT)   Date/Time Value Ref Range Status   07/06/2025 01:26 AM 13 12 - 45 U/L Final     ALT(SGPT)   Date/Time Value Ref Range Status   07/06/2025 01:26 AM 11 2 - 50 U/L Final     Total Bilirubin   Date/Time Value Ref Range Status   07/06/2025 01:26 AM 0.2 0.1 - 1.5 mg/dL Final      No results found for: \"CPKTOTAL\"   Recent Labs     07/06/25  0126   ASTSGOT 13   ALTSGPT 11   TBILIRUBIN 0.2   ALKPHOSPHAT 96   GLOBULIN 2.7       No results found for: \"BLOODCULTU\", \"BLDCULT\", \"BCHOLD\"    No results found for: \"BLOODCULTU\", \"BLDCULT\", \"BCHOLD\"       CULTURE TISSUE W/ GRM STAIN  Order: [id number]   Status: Final result       Next appt: Today at 01:30 PM in Infectious Diseases (Bob Wall A.P.R.N.)    Test Result Released: Yes (not seen)    Specimen Information: Bone   0 Result Notes      Component  Ref Range & Units (hover) 1 mo ago   Significant Indicator POS Positive (POS)   Source BONE   Site left foot   Culture Result - Abnormal    Gram Stain Result Few WBCs.  Few Gram positive cocci.   Culture Result  Abnormal   Staphylococcus aureus  Moderate growth  Methicillin sensitive by screening method  See previous culture for sensitivity report.    Resulting Agency [facility]             Specimen Collected: 04/21/25  4:24 PM Last Resulted: 04/23/25 10:06 " AM        Lab Flowsheet        Order Details        View Encounter        Lab and Collection Details        Routing        Result History     View All Conversations on this Encounter     Result Care Coordination      Patient Communication     Add Comments   Not seen Back to Top      Contains abnormal data Anaerobic Culture  Order: [id number] - Reflex for Order [id number]   Status: Final result       Next appt: Today at 01:30 PM in Infectious Diseases (Bob Shell, A.P.R.N.)    Test Result Released: Yes (not seen)    Specimen Information: Bone   0 Result Notes      Component  Ref Range & Units (hover) 1 mo ago   Significant Indicator POS Positive (POS)   Source BONE   Site left foot   Culture Result - Abnormal    Culture Result  Abnormal   Finegoldia magna  Moderate growth    Resulting Agency [facility]             Specimen Collected: 04/21/25  4:24 PM Last Resulted: 04/24/25  3:39 PM   ntains abnormal data Anaerobic Culture  Order: [id number] - Reflex for Order [id number]   Status: Final result       Next appt: Today at 01:30 PM in Infectious Diseases (Bob Shell, A.P.R.N.)    Test Result Released: Yes (not seen)    Specimen Information: Bone   0 Result Notes      Component  Ref Range & Units (hover) 1 mo ago   Significant Indicator POS Positive (POS)   Source BONE   Site left foot   Culture Result - Abnormal    Culture Result  Abnormal   Finegoldia magna  Moderate growth    Resulting Agency [facility]             Specimen Collected: 04/21/25  4:24 PM Last Resulted: 04/24/25  3:39 PM   BLOOD CULTURE  Order: [id number]   Status: Final result       Next appt: Today at 01:30 PM in Infectious Diseases (Bob Shell, A.P.R.N.)    Test Result Released: Yes (not seen)    Specimen Information: Peripheral; Blood   0 Result Notes      Component  Ref Range & Units (hover) 4 wk ago   Significant Indicator POS Positive (POS)   Source BLD   Site PERIPHERAL   Culture Result Growth detected by automated blood culture system. 10:10 Abnormal     Culture Result  Abnormal   Staphylococcus aureus  Methicillin sensitive by screening method  See previous culture for sensitivity report.    Resulting Agency [facility]             Specimen Collected: 04/22/25  5:27 AM Last Resulted: 04/25/25  9:46 AM       Culture Wound With Gram Stain  Order: [id number]   Status: Final result       Next appt: Today at 01:30 PM in Infectious Diseases (Bob Wall, A.P.R.N.)    Test Result Released: Yes (not seen)    Specimen Information: Left Foot; Wound   0 Result Notes      Component  Ref Range & Units (hover) 1 mo ago   Significant Indicator POS Positive (POS)   Source WND   Site LEFT FOOT   Culture Result Light growth usual skin heather. Abnormal    Gram Stain Result Few WBCs.  Many Gram positive cocci.   Culture Result  Abnormal   Staphylococcus aureus  Heavy growth  Methicillin sensitive by screening method    Resulting Agency [facility]        Susceptibility     Staphylococcus aureus     YESENIA     Ampicillin/sulbactam <=8/4 mcg/mL Sensitive     Cefazolin <=8 mcg/mL Sensitive     Cefepime <=4 mcg/mL Sensitive     Clindamycin <=0.25 mcg/mL Sensitive     Daptomycin 1 mcg/mL Sensitive     Erythromycin >4 mcg/mL Resistant     Oxacillin 0.5 mcg/mL Sensitive     Tetracycline <=4 mcg/mL Sensitive     Trimeth/Sulfa <=0.5/9.5 m... Sensitive     Vancomycin 1 mcg/mL Sensitive                 Specimen Collected: 04/20/25  9:45 PM Last Resulted: 04/23/25  9:31 AM       ntains abnormal data Blood Culture - Draw one from central line and one from peripheral site  Order: [id number]   Status: Final result       Next appt: Today at 01:30 PM in Infectious Diseases (Bob Shell, A.P.R.N.)    Test Result Released: Yes (not seen)    Specimen Information: Line; Blood   0 Result Notes      Component  Ref Range & Units (hover) 1 mo ago   Significant Indicator POS Positive (POS)   Source BLD   Site Peripheral   Culture Result  Abnormal   Growth detected by automated blood culture system.  Staphylococcus aureus  (methicillin sensitive)  detected by PCR.    Culture Result Staphylococcus aureus Abnormal    Resulting Agency [facility]        Susceptibility     Staphylococcus aureus     YESENIA     Ampicillin/sulbactam <=8/4 mcg/mL Sensitive     Cefazolin <=8 mcg/mL Sensitive     Cefepime <=4 mcg/mL Sensitive     Clindamycin 0.5 mcg/mL Sensitive     Daptomycin 1 mcg/mL Sensitive     Erythromycin >4 mcg/mL Resistant     Oxacillin 0.5 mcg/mL Sensitive     Tetracycline <=4 mcg/mL Sensitive     Trimeth/Sulfa <=0.5/9.5 m... Sensitive     Vancomycin 1 mcg/mL Sensitive                 Specimen Collected: 04/20/25  9:13 PM Last Resulted: 04/23/25  9:15 AM      Latest Reference Range & Units 04/20/25 21:13 04/20/25 21:45 04/21/25 07:40 04/21/25 16:24 04/22/25 05:27 04/24/25 12:59   MRSA by PCR Negative   Negative       Significant Indicator  POS ! POS !  POS !  . NEG POS !  POS !  . NEG  POS ! NEG  NEG   Site  Peripheral LEFT FOOT  PERIPHERAL  LEFT FOOT - left foot  left foot  left foot PERIPHERAL  PERIPHERAL PERIPHERAL  PERIPHERAL   Source  BLD WND  BLD  WND UR BONE  BONE  BONE BLD  BLD BLD  BLD   !: Data is abnormal    DX-FOOT-COMPLETE 3+ LEFT  Order: [id number]   Status: Final result       Next appt: Today at 01:30 PM in Infectious Diseases (ARIE HarrisonRSOILA)    Test Result Released: Yes (not seen)    0 Result Notes  Details    Reading Physician Reading Date Result Priority   Katie Collins M.D.  [phone] 4/20/2025      Narrative & Impression     4/20/2025 9:28 PM     HISTORY/REASON FOR EXAM: Atraumatic Pain/Swelling/Deformity     TECHNIQUE/EXAM DESCRIPTION:  AP, lateral, and oblique views of the LEFT foot.     COMPARISON:  February 26, 2025     FINDINGS:     Postoperative changes of transmetatarsal amputation of the fifth toe is seen. Slight permeative appearance of the lateral cortex of the fifth toe metatarsal waist is seen. There is soft tissue gas in small bony fragments adjacent to the fracture site.     IMPRESSION:        1.   Slight permeative appearance of the lateral cortex of the fifth toe metatarsal waist, appearance raising concern for component of osteomyelitis.  2.  Soft tissue gas at the amputation site, consider underlying infectious etiology.        Exam Ended: 25 10:00 PM Last Resulted: 25 10:22 PM   EC-ECHOCARDIOGRAM COMPLETE W/O CONT  Order: [id number]   Status: Final result       Next appt: Today at 01:30 PM in Infectious Diseases (Bob Wall A.P.R.N.)    Test Result Released: Yes (not seen)    0 Result Notes  Details    Reading Physician Reading Date Result Priority   No Reading Provider Prelim 2025    Celestino Jay M.D.  [phone] 2025      Result Text  Transthoracic  Echo Report        Echocardiography Laboratory     CONCLUSIONS  Normal left ventricular systolic function. The left ventricular   ejection fraction is visually estimated to be 65%  Mild concentric left ventricular hypertrophy.  Normal right ventricular size and systolic function.  Aortic valve sclerosis without significant stenosis.  Right ventricular systolic pressure is estimated to be 26 mmHg   (normal).   Semimobile echodensity seen at the junction of the aorta and   brachiocephalic artery - recommend further imaging of the aorta.   No prior study is available for comparison.  Primary team is notified of findings.     REX ECHEVARRIA  Exam Date:         2025                      08:52  Exam Location:     Inpatient  Priority:          Routine     Ordering Physician:        BRIGID MARCIAL  Referring Physician:       [id number]  Sonographer:               Yaneth Jade     Age:    53     Gender:    M  MRN:    [id number]  :    1971  BSA:    2.22   Ht (in):    73     Wt (lb):    216  Exam Type:     Complete     Indications:     Bacteremia  ICD Codes:       R78.81     CPT Codes:       31318     BP:   119    /   83     HR:  Technical Quality:        Fair     MEASUREMENTS  (Male / Female) Normal Values  2D ECHO  LV Diastolic Diameter PLAX        3.9 cm                4.2 - 5.9 / 3.9 - 5.3   cm  LV Systolic Diameter PLAX         2 cm                  2.1 - 4.0 cm  IVS Diastolic Thickness           1.2 cm                  LVPW Diastolic Thickness          1.2 cm                  LVOT Diameter                     2 cm                    LV Ejection Fraction MOD BP       65.3 %                >= 55  %  LV Ejection Fraction MOD 4C       69.4 %                  LV Ejection Fraction MOD 2C       59.2 %                  LV Ejection Fraction 4C AL        73.2 %                  LV Ejection Fraction 2C AL        63.1 %                  LA Volume Index                   21.6 cm3/m2           16 - 28 cm3/m2     DOPPLER  AV Peak Velocity                  1.3 m/s                 AV Peak Gradient                  7.1 mmHg                AV Mean Gradient                  4 mmHg                  LVOT Peak Velocity                1.1 m/s                 AV Area Cont Eq vti               2.7 cm2                 Mitral E Point Velocity           1.2 m/s                 Mitral E to A Ratio               1.4                     MV Pressure Half Time             55 ms                   MV Area PHT                       4 cm2                   MV Deceleration Time              187 ms                  TR Peak Velocity                  239 cm/s                PV Peak Velocity                  1 m/s                   PV Peak Gradient                  4.3 mmHg                RVOT Peak Velocity                0.75 m/s                LV E' Lateral Velocity            11.1 cm/s               Mitral E to LV E' Lateral Ratio   10.5                    LV E' Septal Velocity             7.3 cm/s                Mitral E to LV E' Septal Ratio    16                         * Indicates values subject to auto-interpretation  LV EF:        %     FINDINGS  Left Ventricle  Normal left ventricular  chamber size. Mild concentric left ventricular   hypertrophy. Normal left ventricular systolic function. The left   ventricular ejection fraction is visually estimated to be 65%. No   regional wall motion abnormalities. Indeterminate diastolic function.     Right Ventricle  Normal right ventricular size and systolic function.     Right Atrium  Normal right atrial size. Normal inferior vena cava size and   inspiratory collapse.     Left Atrium  Normal left atrial size. Left atrial volume index is 20 mL/sq m.     Mitral Valve  Structurally normal mitral valve without significant stenosis or   regurgitation.     Aortic Valve  Tricuspid aortic valve. Aortic valve sclerosis without significant   stenosis. No aortic insufficiency.     Tricuspid Valve  Structurally normal tricuspid valve without significant stenosis. Trace   tricuspid regurgitation. Right atrial pressure is estimated to be 3   mmHg. Right ventricular systolic pressure is estimated to be 26 mmHg.     Pulmonic Valve  Structurally normal pulmonic valve without significant stenosis or   regurgitation.     Pericardium  No significant pericardial effusion.     Aorta  Normal aortic root for body surface area. The ascending aorta diameter   is 3.1 cm. Semimobile echodensity seen at the junction of the aorta and   brachiocephalic artery.                                Celestino Jay MD  (Electronically Signed)  Final Date:     22 April 2025                   10:44     All Measurements        Exam Ended: 04/22/25 10:21 AM Last Resulted: 04/22/25 10:44 AM   CT-CTA COMPLETE THORACOABDOMINAL AORTA  Order: [id number]   Status: Final result       Next appt: Today at 01:30 PM in Infectious Diseases (Bob Wall A.P.R.N.)    Test Result Released: Yes (not seen)    0 Result Notes  Details    Reading Physician Reading Date Result Priority   Aristides Thomas M.D.  [phone] 4/23/2025      Narrative & Impression     4/23/2025 1:43 PM     HISTORY/REASON FOR EXAM:  Chest  pain.        TECHNIQUE/EXAM DESCRIPTION:  CT angiogram without and with contrast, with reconstructions.     Initial precontrast thick helical sections were obtained from the top of the aortic arch through the diaphragmatic domes. Following this, thin-section postcontrast helical images were obtained from the lung apices through the iliac crests following the   bolus administration of mL of nonionic Omnipaque 350 contrast.  CT angiographic technique was utilized.     Parasagittal reconstructed images of the aorta were generated utilizing multiplanar volume reformat technique.     Low dose optimization technique was utilized for this CT exam including automated exposure control and adjustment of the mA and/or kV according to patient size.     COMPARISON: None available.     FINDINGS:     Aorta: Pre-contrast images demonstrate no evidence of displaced calcified intima or intramural hematoma.  Aortic arch: Branching pattern is conventional.  Diameter: The ascending and descending aorta are of normal caliber.  Dissection: Absent.  Celiac artery origin: Widely patent.  Superior mesenteric artery origin: Widely patent.  Renal artery origins: Widely patent.  Inferior mesenteric artery: Patent.  Common iliac arteries: Nonaneurysmal and patent.     Heart: There is atherosclerotic change of the coronary arteries especially involving the left anterior descending artery origin.     3D angiographic/MIP images of the vasculature confirm the vascular findings as described above.     Lungs: There is bibasilar atelectasis.  Liver: There is fatty change. There is an enhancing mass involving the right lobe posteriorly measuring 2.3 cm size likely representing hemangioma.  Biliary system: No intrahepatic or extrahepatic ductal dilatation. The gallbladder is grossly unremarkable.  Spleen: Unremarkable.  Adrenal glands: There is 17 mm left adrenal nodule. As 12 mm right adrenal nodule.  Pancreas: Unremarkable.  Kidneys: There are small  bilateral simple appearing renal cysts. No follow-up recommended.  Bowel: There is moderate constipation. No bowel obstruction or focal inflammatory change.  Ascites: None.  Lymph nodes: No adenopathy is identified.  Bones: There is partial fusion across the left SI joint.     IMPRESSION:     1.  No evidence of aortic aneurysm or dissection.     2.  Atherosclerotic calcification of the coronary arteries especially involving the left anterior ascending artery origin     3.  Fatty liver with a 2.3 cm right lobe hepatic hemangioma.     4.  Bilateral adrenal nodules measuring 17 mm on the left and 12 mm on the right.     5.  Moderate constipation.                          Exam Ended: 04/23/25  1:56 PM Last Resulted: 04/23/25  2:33 PM   MR-CERVICAL SPINE-WITH & W/O  Order: [id number]   Status: Final result       Next appt: Today at 01:30 PM in Infectious Diseases (Bob Wall A.P.R.N.)    Test Result Released: Yes (not seen)    0 Result Notes  Details    Reading Physician Reading Date Result Priority   Nishant Glez M.D.  [phone] 4/25/2025      Narrative & Impression     4/25/2025 11:05 AM     HISTORY/REASON FOR EXAM:  Back pain.     TECHNIQUE/EXAM DESCRIPTION:  MRI of the cervical spine without and with contrast.     Multiplanar multisequence MR examination of the cervical spine. 20 mL ProHance contrast was administered intravenously.     COMPARISON: None.     FINDINGS:  There are postsurgical changes as evidenced by C4-5 and C5-6 disc prosthesis. Magnetic susceptibility artifact is noted at these levels. The cervical spine maintains normal alignment. There is no destructive lesion. There is no perivertebral, disc   epidural fluid collection.     The craniovertebral junction is well aligned.     At the level of C2-3,there is no spinal or neural foraminal stenosis.     At the level of C3-4,there is left-sided uncinate joint arthropathy causing mild left neural foraminal stenosis.     At the level of  C4-5,there is disc prosthesis. There is no spinal or neural foraminal stenosis.     At the level of C5-6,there is disc prosthesis. Bilateral uncinate joint arthropathy seen. There is moderate bilateral neural foraminal stenosis. There is moderate central canal stenosis.     At the level of C6-7,there is bilateral uncinate joint arthropathy. There is moderate bilateral neural foraminal stenosis.     At the level of C7-T1,there is no spinal or neural foraminal stenosis.     The visualized posterior fossa structures appear normal within limits.  The cervical spinal cord does not demonstrate any mass or abnormal T2 signal intensity. Brainstem gliosis is seen.     The visualized pre-and paraspinal soft tissues appear normal within limits.     There is no abnormal contrast enhancement.     IMPRESSION:     1.  Postsurgical and degenerative changes in the cervical spine as described above.  2.  Moderate central canal stenosis at C5-6.  3.  Multifocal neural foraminal stenosis.  4.  There is no evidence of infection.        Exam Ended: 04/25/25  1:41 PM Last Resulted: 04/25/25  2:23 PM       MR-THORACIC SPINE-WITH & W/O  Order: [id number]   Status: Final result       Next appt: Today at 01:30 PM in Infectious Diseases (SALVADOR Harrison.P.R.N.)    Test Result Released: Yes (not seen)    0 Result Notes  Details    Reading Physician Reading Date Result Priority   Nishant Glez M.D.  [phone] 4/25/2025      Narrative & Impression     4/25/2025 11:17 AM     HISTORY/REASON FOR EXAM:  Back pain in the setting of persistent MSSA bacteremia     TECHNIQUE/EXAM DESCRIPTION:  MRI of the thoracic spine without and with contrast.     Multiplanar multisequence MR examination of the thoracic spine.     20 mL ProHance contrast was administered intravenously.     COMPARISON:  None.     FINDINGS: There is mild chronic vertebral height loss at T7, and T8 vertebral bodies. There is no acute fracture. There is no pathologic lesion. There  is no perivertebral, disc or epidural fluid collection.     At the level of T5-6. there is small right paracentral disc protrusion without significant spinal or neural foraminal stenosis. There is no intradural extramedullary lesion. There is no abnormal intramedullary T2 signal intensity.     Mild left pleural effusion is seen. Bilateral basilar atelectasis is seen.        IMPRESSION:     1.  Mild chronic compression deformity at T7 and T8.  2.  No acute fracture.  3.  Mild degenerative disease.  4.  No evidence of infection.        Exam Ended: 04/25/25  1:42 PM Last Resulted: 04/25/25  2:28 PM   MR-LUMBAR SPINE-WITH & W/O  Order: [id number]   Status: Final result       Next appt: Today at 01:30 PM in Infectious Diseases (SALVADOR Harrison.P.R.NSridhar)    Test Result Released: Yes (not seen)    0 Result Notes  Details    Reading Physician Reading Date Result Priority   Nishant Glez M.D.  [phone] 4/25/2025      Narrative & Impression     4/25/2025 11:17 AM     HISTORY/REASON FOR EXAM:  R/O osteomyelitis.        TECHNIQUE/EXAM DESCRIPTION:  MRI of the lumbar spine without and with contrast.     Multiplanar multisequence MR examination of the lumbar spine. 20 mL ProHance contrast was administered intravenously.     COMPARISON:  None.     FINDINGS:  The lumbar spine maintains normal height and alignment. There is no fracture or dislocation. There is no pathologic marrow infiltration. There is no aggressive osseous lesion. There is no perivertebral, disc or epidural fluid collection.     At the level of L5-S1,there is mild bilateral facet joint arthropathy. There is no spinal or neural foraminal stenosis.     At the level of L4-5,there is disc degeneration. There is minimal disc bulge. Mild bilateral facet joint arthropathy seen. There is no spinal canal stenosis. There is mild bilateral neural foraminal stenosis.     At the level of L3-4,there is no central canal stenosis. There is mild bilateral neural foraminal  stenosis.     At the level of L2-3,there is no spinal or neural foraminal stenosis.     At the level of L1-2,there is no spinal or neural foraminal stenosis.     The conus terminates at the level of L1. The visualized lower thoracic spinal cord is unremarkable. There is no lumbar intradural lesion.     The visualized pre-and paraspinal soft tissues appear normal.     There is no abnormal contrast enhancement.     IMPRESSION:     1.  There is no evidence of infection in the lumbar spine.  2.  Mild degenerative changes        Exam Ended: 04/25/25  1:44 PM Last Resulted: 04/25/25  2:19 PM       CT-ABDOMEN-PELVIS WITH  Order: [id number]   Status: Final result       Next appt: Today at 01:30 PM in Infectious Diseases (SALVADOR Harrison.P.R.N.)    Test Result Released: Yes (not seen)    0 Result Notes  Details    Reading Physician Reading Date Result Priority   Luis Bonilla M.D.  [phone] 4/26/2025      Narrative & Impression     4/26/2025 3:12 PM     HISTORY/REASON FOR EXAM:  Concern for bowel obstruction. Abdominal distention.     TECHNIQUE/EXAM DESCRIPTION:   CT scan of the abdomen and pelvis with contrast.     Contrast-enhanced helical scanning was obtained from the diaphragmatic domes through the pubic symphysis following the bolus administration of nonionic contrast without complication.     96 mL of Omnipaque 350 nonionic contrast was administered without complication.     Low dose optimization technique was utilized for this CT exam including automated exposure control and adjustment of the mA and/or kV according to patient size.     COMPARISON: CTA of the thoracoabdominal aorta, 4/23/2025.     FINDINGS:  Lower Chest: Bibasilar subsegmental atelectasis. The heart size is normal. No pleural or pericardial effusion.     Liver: Unremarkable.     Spleen: Unremarkable.     Pancreas: Unremarkable.     Gallbladder: No calcified stones.     Biliary: Nondilated.     Adrenal glands: Stable bilateral adrenal  nodules.     Kidneys: Unremarkable without hydronephrosis.     Bowel: No obstruction or acute inflammation.     Lymph nodes: No adenopathy.     Vasculature: Unremarkable.     Peritoneum: Unremarkable without ascites.     Musculoskeletal: No acute or destructive process.     Pelvis: No adenopathy or free fluid. Central zone prostatic calcifications. Seminal vesicles are within normal limits. The bladder is unremarkable.     IMPRESSION:     1.  No bowel obstruction.  2.  Stable bilateral adrenal nodules.  3.  Bibasilar subsegmental atelectasis.           Exam Ended: 04/26/25  3:36 PM Last Resulted: 04/26/25  3:44 PM       Pathology Specimen  Order: [id number]   Status: Final result       Next appt: Today at 01:30 PM in Infectious Diseases (CAROL HarrisonP.R.N.)    Test Result Released: Yes (seen)    0 Result Notes        Narrative  Performed by: [facility]                          DEPARTMENT OF PATHOLOGY                   [address] [place] [address]  [address]              Phone [phone]          Fax [phone]  Natalee Nieves M.D.     Loli Wilkes M.D.     ZOILA Contreras D.O. Jennifer Jeung, M.D.     SHON Mccarty                                    D.O.    Patient:    REX ECHEVARRIA     Specimen    [id number]                                           #:      Copies to:                        SURGICAL PATHOLOGY CONSULTATION      FINAL DIAGNOSIS:    A. Left foot 5th metatarsal:         Acute osteomyelitis.                                          Diagnosis performed by:                                      TIBURCIO DASH  ------------------------------------------------------------------------  ---------------------------------------------------------------  CODES: 2005x1  2205x1  [id number]  [id number]    SPECIMEN(S)  A. Left foot 5th metatarsal:    PREOPERATIVE DIAGNOSIS:  Wound  "dehiscence    POSTOPERATIVE DIAGNOSIS:  Wound dehiscence       GROSS DESCRIPTION:  A. Received in formalin labeled with the patient's name, medical record  number, and \"left foot fifth metatarsal\" is a 4.7 cm in length by up to  2.5 cm in maximum diameter portion of bone having a single smooth  transected surface, and articular cartilage on the opposing aspect.  Transected surface inked blue.  The cut surfaces are tan-pink to  yellow, trabeculated, and without obvious bone softening.  Decalcified  in HCl prior to processing. RS2 /[id number] [facility]    SLIDE KEY:  A1:  Representative perpendicular transected surface (inked blue)  A2:  Representative articular aspect    MICROSCOPIC DESCRIPTION:  Microscopic examination was performed.  Please see diagnosis.    Report electronically signed by:  TIBURCIO DASH ,  Diagnosis performed at: [facility]  Pathology  Department, [address] [place] [address]  [address]      Printed 00/00/0000 [id number] PITERCOLLINREX   Page 1 of 1 MRN#:[id number]   Specimen Collected: 04/21/25  4:30 PM Last Resulted: 04/24/25 12:55 PM     DX-FOOT-COMPLETE 3+ LEFT  Order: [id number]   Status: Final result       Next appt: 09/30/2025 at 11:00 AM in Endocrinology (ARIE LalRSridharNSridhar)    Test Result Released: Yes (not seen)    0 Result Notes  Details    Reading Physician Reading Date Result Priority   Jorge Duran M.D.  [phone] 7/6/2025      Narrative & Impression     7/6/2025 1:30 AM     HISTORY/REASON FOR EXAM:  Left foot pain        TECHNIQUE/EXAM DESCRIPTION AND NUMBER OF VIEWS:  3 nonweightbearing views of the LEFT foot.     COMPARISON:  04/20/2025     FINDINGS:  Bone mineralization is normal.  There is no evidence of fracture or dislocation.  There is now complete amputation of the fifth digit. Soft tissue swelling is noted in the lateral foot area.  Periosteal reaction is noted around the proximal   diaphysis of the fourth digit.     IMPRESSION:     1.  " No acute fracture or dislocation.     2.  Periosteal reaction is noted around the proximal diaphysis of the fourth digit. Osteomyelitis is a possibility.     3.  Soft tissue swelling is noted.        Exam Ended: 07/06/25  1:30 AM Last Resulted: 07/06/25  1:36 AM         Impression/Assessment      1. Type 2 diabetes mellitus with hyperglycemia, with long-term current use of insulin ([facility])  CULTURE WOUND W/ GRAM STAIN      2. Left foot infection  cephALEXin (KEFLEX) 500 MG Cap    CULTURE WOUND W/ GRAM STAIN    CBC WITH DIFFERENTIAL    Comp Metabolic Panel      3. Open wound of fourth toe of left foot, subsequent encounter  CBC WITH DIFFERENTIAL    Comp Metabolic Panel      Donald Fuller is a 53 y.o. male with history of uncontrolled type 2 diabetes mellitus, prior left fifth toe amputation in February admitted on 4/20/2025 secondary to worsening left foot swelling and drainage of prior amputation site. X-ray revealed findings concerning for osteomyelitis. He is now status post left fifth ray amputation with wound VAC placement on 4/21/2025. Hospital course complicated by MSSA bacteremia.  Blood cultures x 2 on 4/20+ MSSA. Repeat blood cultures 1 out of 2 sets on 4/22-MSSA. Repeat blood cultures on 4/24-negative to date.  1 blood cultures that triggered a CO2 production but cultures have been negative.  Return to the OR on 4/23/2025 for repeat I&D and surgical wound closure. OR cultures-MSSA. Pathology-acute osteomyelitis. TTE negative for any valvular abnormalities however there was a semimobile echodensity at the junction of the aorta and brachiocephalic artery. CTA aorta was negative for aortic aneurysm or dissection.  Showed atherosclerotic calcification in the coronary arteries. S/P I&D secondary closure of surgical wound left foot amputation on 4/23/2025.  6-week course of IV ertapenem 1 g daily with end date of 6/5/2025  - MRI C/T and L-spine negative for osteomyelitis    7/8/25-patient returns to ID  clinic with complaints of drainage from his surgical site that started a few days prior.  Patient noted increasing redness, swelling and purulent drainage from previous suture site along the left lateral foot.  He recently went to the emergency room prescribed p.o. Keflex which he has noticed some improvement in his wound.  X-ray of left foot completed in the emergency room with periosteal reaction is noted around the proximal diaphysis of the fourth digit. Osteomyelitis is a possibility.  Today he denies any nausea, vomiting, fever, chills, constipation or diarrhea.  Macerated wound edges along previous surgical wound.  Clinical photo uploaded.  I was able to express purulent fluid from lateral foot.  Wound culture sent to lab.  Given that patient is on Keflex this will most likely decrease culture yield but will hopefully rule out secondary infection.  Most recent labs reviewed from 7/6/2025.  Repeat labs in 1 week for continuous mind.  Recommend patient follow-up with [place] [facility] surgery ASAP for wound site eval.  May possibly need additional I&D or amputation for source control    - This infection/ chronic illness posses a threat to life, bodily function, and limb preservation   PLAN:   - Continue p.o. Keflex 1000 mg twice daily.  Extended an additional 14 days.    - Pending wound culture to rule out secondary infection.  Patient on Keflex which will decrease culture yield  - Continue wound care to the left foot  - Recommend follow-up with [facility] [place] ASAP.  May possibly need repeat I&D or additional amputation for source control.  Previously completed 6 weeks of IV antibiotic therapy reoccurrence of infection  - Recommended to check blood sugars 2-3 times daily and keep below 150 to allow for adequate wound and prevent secondary infection.  - Education provided on signs and symptoms of recurrence of infection and when report to ER/call 911      Return visit: 1 week. Follow up with  yes primary care physician for chronic medical problems    I have performed a physical exam,  updated ROS and plan today. I have reviewed previous images, labs, and provider notes.      RANDOLPH Harrison.    All Patients should seek medical re-evaluation or report to the ER for new, increasing or worsening symptoms. In some circumstances medical conditions can change from the initial evaluation and may require emergent medical re-evaluation. This includes but is not limited to chest pain, shortness of breath, atypical abdominal pain, atypical headache, ALOC, fever >101, low blood pressure, high respiratory rate (above 30), low oxygen saturation (below 90%), acute delirium, abnormal bleeding, inability to tolerate any intake, weakness on one side of the body, any worsened or concerning conditions.    Please note that this dictation was created using voice recognition software. I have worked with technical experts from [facility] to optimize the interface.  I have made every reasonable attempt to correct obvious errors, but there may be errors of grammar and possibly content that I did not discover before finalizing the note.         [1]   Past Medical History:  Diagnosis Date    Diabetes (HCC)    [2]   Past Surgical History:  Procedure Laterality Date    WOUND IRRIGATION & DEBRIDEMENT Left 4/23/2025    Procedure: DEBRIDEMENT AND WOUND CLOSURE LEFT FOOT;  Surgeon: Johnathan Weldon M.D.;  Location: SURGERY [facility];  Service: Orthopedics    FOOT AMPUTATION Left 4/21/2025    Procedure: AMPUTATION REVISION, RAY AMPUTATION;  Surgeon: Nik Andersen M.D.;  Location: SURGERY [facility];  Service: Orthopedics    FOOT AMPUTATION Left 2/28/2025    Procedure: AMPUTATION, FOOT-4TH AND 5TH RAY RESECTION;  Surgeon: Jason Davis M.D.;  Location: SURGERY [facility];  Service: Orthopedics   [3] No Known Allergies

## (undated) DEVICE — TIP METZENBAUM SCISSOR MONOPOLAR ENDOCUT (ORANGE)

## (undated) DEVICE — TROCAR ETHICON ENDOPATH XCEL UNIVERSAL SLEEVE WITH OPTIVIEW 5MM X 100MM

## (undated) DEVICE — SOL IRR BAG NS 0.9% 3000ML

## (undated) DEVICE — POSITIONER FOAM EGG CRATE ULNAR 2PCS (PINK)

## (undated) DEVICE — Device

## (undated) DEVICE — ENDOCATCH 10MM SPECIMEN POUCH

## (undated) DEVICE — TROCAR COVIDIEN VERSAPORT BLADELESS OPTICAL 12MM STANDARD

## (undated) DEVICE — MARKING PEN W RULER

## (undated) DEVICE — SUT VICRYL 0 27" UR-6

## (undated) DEVICE — GLV 8 PROTEXIS (WHITE)

## (undated) DEVICE — VENODYNE/SCD SLEEVE CALF MEDIUM

## (undated) DEVICE — PACK GENERAL LAPAROSCOPY

## (undated) DEVICE — SUT MONOCRYL 4-0 18" PS-2

## (undated) DEVICE — TUBING STRYKER PNEUMOSURE HI FLOW INSUFFLATOR

## (undated) DEVICE — WARMING BLANKET UPPER ADULT

## (undated) DEVICE — TROCAR ETHICON ENDOPATH XCEL BLADELESS 5MM X 100MM STABILITY

## (undated) DEVICE — DRSG DERMABOND 0.7ML